# Patient Record
Sex: MALE | Race: BLACK OR AFRICAN AMERICAN | Employment: UNEMPLOYED | URBAN - METROPOLITAN AREA
[De-identification: names, ages, dates, MRNs, and addresses within clinical notes are randomized per-mention and may not be internally consistent; named-entity substitution may affect disease eponyms.]

---

## 2017-03-08 DIAGNOSIS — Z00.00 ROUTINE GENERAL MEDICAL EXAMINATION AT A HEALTH CARE FACILITY: ICD-10-CM

## 2017-03-08 DIAGNOSIS — Z86.73 HISTORY OF STROKE: ICD-10-CM

## 2017-03-08 DIAGNOSIS — Z13.39 SCREENING FOR ALCOHOLISM: ICD-10-CM

## 2017-03-08 DIAGNOSIS — M89.9 DISORDER OF BONE AND CARTILAGE: ICD-10-CM

## 2017-03-08 DIAGNOSIS — M94.9 DISORDER OF BONE AND CARTILAGE: ICD-10-CM

## 2017-03-08 DIAGNOSIS — Z23 ENCOUNTER FOR IMMUNIZATION: ICD-10-CM

## 2017-06-21 ENCOUNTER — OFFICE VISIT (OUTPATIENT)
Dept: FAMILY MEDICINE CLINIC | Age: 62
End: 2017-06-21

## 2017-06-21 VITALS
HEART RATE: 64 BPM | DIASTOLIC BLOOD PRESSURE: 80 MMHG | HEIGHT: 72 IN | BODY MASS INDEX: 29.39 KG/M2 | OXYGEN SATURATION: 97 % | WEIGHT: 217 LBS | TEMPERATURE: 97.1 F | RESPIRATION RATE: 17 BRPM | SYSTOLIC BLOOD PRESSURE: 148 MMHG

## 2017-06-21 DIAGNOSIS — S61.432A PUNCTURE WOUND OF LEFT HAND, FOREIGN BODY PRESENCE UNSPECIFIED, INITIAL ENCOUNTER: ICD-10-CM

## 2017-06-21 DIAGNOSIS — R29.898 LEFT ARM WEAKNESS: ICD-10-CM

## 2017-06-21 DIAGNOSIS — Z86.73 HISTORY OF STROKE: ICD-10-CM

## 2017-06-21 DIAGNOSIS — S69.92XA HAND INJURY, LEFT, INITIAL ENCOUNTER: Primary | ICD-10-CM

## 2017-06-21 RX ORDER — SULFAMETHOXAZOLE AND TRIMETHOPRIM 400; 80 MG/1; MG/1
1 TABLET ORAL 2 TIMES DAILY
Qty: 14 TAB | Refills: 0 | Status: SHIPPED | OUTPATIENT
Start: 2017-06-21 | End: 2017-06-28

## 2017-06-21 NOTE — MR AVS SNAPSHOT
Visit Information Date & Time Provider Department Dept. Phone Encounter #  
 6/21/2017 11:45 AM Derrick Oneal, [address] [id number] Follow-up Instructions Return for Patient will call for follow up after seeing hand specialist  . Upcoming Health Maintenance Date Due Hepatitis C Screening 1955 DTaP/Tdap/Td series (1 - Tdap) 12/21/1975 FOBT Q 1 YEAR AGE 50-75 12/21/2004 ZOSTER VACCINE AGE 60> 12/21/2014 INFLUENZA AGE 9 TO ADULT 8/1/2017 Allergies as of 6/21/2017  Review Complete On: 4/10/6860 By: Malathi De Leon. Reji Res, BLAYNEN No Known Allergies Current Immunizations  Never Reviewed No immunizations on file. Not reviewed this visit You Were Diagnosed With   
  
 Codes Comments Hand injury, left, initial encounter    -  Primary ICD-10-CM: L87.56ZZ ICD-9-CM: 959.4 Left arm weakness     ICD-10-CM: R29.898 ICD-9-CM: 729.89 Puncture wound of left hand, foreign body presence unspecified, initial encounter     ICD-10-CM: H51.147O ICD-9-CM: 882.0 History of stroke     ICD-10-CM: Z86.73 
ICD-9-CM: V12.54 Vitals BP Pulse Temp Resp Height(growth percentile) Weight(growth percentile) 148/80 (BP 1 Location: Right arm, BP Patient Position: Sitting) 64 97.1 °F (36.2 °C) (Oral) 17 6' (1.829 m) 217 lb (98.4 kg) SpO2 BMI Smoking Status 97% 29.43 kg/m2 Never Smoker BMI and BSA Data Body Mass Index Body Surface Area  
 29.43 kg/m 2 2.24 m 2 Preferred Pharmacy Pharmacy Name Phone [facility], [address] Your Updated Medication List  
  
   
This list is accurate as of: 6/21/17 12:45 PM.  Always use your most recent med list.  
  
  
  
  
 ALEVE 220 mg tablet Generic drug:  naproxen sodium Take 220 mg by mouth two (2) times daily (with meals). ergocalciferol 50,000 unit capsule Commonly known as:  ERGOCALCIFEROL Take 1 Cap by mouth every seven (7) days. MOTRIN  mg tablet Generic drug:  ibuprofen Take  by mouth. trimethoprim-sulfamethoxazole  mg per tablet Commonly known as:  Velvet Ping Take 1 Tab by mouth two (2) times a day for 7 days. Prescriptions Sent to Pharmacy Refills  
 trimethoprim-sulfamethoxazole (BACTRIM, SEPTRA)  mg per tablet 0 Sig: Take 1 Tab by mouth two (2) times a day for 7 days. Class: Normal  
 Pharmacy: [facility] #: [phone] Route: Oral  
  
We Performed the Following REFERRAL TO ORTHOPEDIC SURGERY [REF62 Custom] Comments:  
 Please evaluate patient for Left hand injury. Follow-up Instructions Return for Patient will call for follow up after seeing hand specialist  . Referral Information Referral ID Referred By Referred To  
  
 [id number] [facility], [address] [place] [address] Phone: [phone] Fax: [phone] Visits Status Start Date End Date 1 New Request 6/21/17 6/21/18 If your referral has a status of pending review or denied, additional information will be sent to support the outcome of this decision. Patient Instructions 1.) They will call to set up appointment with the hand specialist. 
 
2.) Take antibiotic for 7 days for your hand. 3.) Return after seeing the hand specialist.  
 
 
  
Puncture Wounds: Care Instructions Your Care Instructions A puncture wound can happen anywhere on your body. These wounds tend to be narrower and deeper than cuts. A puncture wound is usually left open instead of being closed. This is because a puncture wound can be easily infected, and closing it can make infection even more likely. You will probably have a bandage over the wound. The doctor has checked you carefully, but problems can develop later. If you notice any problems or new symptoms, get medical treatment right away. Follow-up care is a key part of your treatment and safety. Be sure to make and go to all appointments, and call your doctor if you are having problems. It's also a good idea to know your test results and keep a list of the medicines you take. How can you care for yourself at home? · Keep the wound dry for the first 24 to 48 hours. After this, you can shower if your doctor okays it. Pat the wound dry. · Don't soak the wound, such as in a bathtub. Your doctor will tell you when it's safe to get the wound wet. · If your doctor told you how to care for your wound, follow your doctor's instructions. If you did not get instructions, follow this general advice: ¨ After the first 24 to 48 hours, wash the wound with clean water 2 times a day. Don't use hydrogen peroxide or alcohol, which can slow healing. ¨ You may cover the wound with a thin layer of petroleum jelly, such as Vaseline, and a nonstick bandage. ¨ Apply more petroleum jelly and replace the bandage as needed. · Prop up the sore area on pillows anytime you sit or lie down during the next 3 days. Try to keep it above the level of your heart. This helps reduce swelling. · Avoid any activity that could cause your wound to get worse. · Be safe with medicines. Read and follow all instructions on the label. ¨ If the doctor gave you a prescription medicine for pain, take it as prescribed. ¨ If you are not taking a prescription pain medicine, ask your doctor if you can take an over-the-counter medicine. · If your doctor prescribed antibiotics, take them as directed. Do not stop taking them just because you feel better. You need to take the full course of antibiotics. When should you call for help? Call your doctor now or seek immediate medical care if: 
· You have new pain, or your pain gets worse. · The wound starts to bleed, and blood soaks through the bandage. Oozing small amounts of blood is normal. 
· The skin near the wound is cold or pale or changes color. · You have tingling, weakness, or numbness near the wound. · You have trouble moving the area near the wound. · You have symptoms of infection, such as: 
¨ Increased pain, swelling, warmth, or redness around the wound. ¨ Red streaks leading from the wound. ¨ Pus draining from the wound. ¨ A fever. Watch closely for changes in your health, and be sure to contact your doctor if: · The wound is not closing (getting smaller). · You do not get better as expected. Where can you learn more? Go to http://chantal-polo.info/. Enter W137 in the search box to learn more about \"Puncture Wounds: Care Instructions. \" Current as of: March 20, 2017 Content Version: 11.3 © 8525-6286 [employer]. Care instructions adapted under license by [employer] (which disclaims liability or warranty for this information). If you have questions about a medical condition or this instruction, always ask your healthcare professional. [address] any warranty or liability for your use of this information. Introducing [facility]! [place] introduces SeekPanda patient portal. Now you can access parts of your medical record, email your doctor's office, and request medication refills online. 1. In your internet browser, go to [email or web address] 2. Click on the First Time User? Click Here link in the Sign In box. You will see the New Member Sign Up page. 3. Enter your SeekPanda Access Code exactly as it appears below. You will not need to use this code after youve completed the sign-up process. If you do not sign up before the expiration date, you must request a new code. · SeekPanda Access Code: [email or web address] Expires: 9/19/2017 11:23 AM 
 
 4. Enter the last four digits of your Social Security Number (xxxx) and Date of Birth (mm/dd/yyyy) as indicated and click Submit. You will be taken to the next sign-up page. 5. Create a Italia Online ID. This will be your Italia Online login ID and cannot be changed, so think of one that is secure and easy to remember. 6. Create a Italia Online password. You can change your password at any time. 7. Enter your Password Reset Question and Answer. This can be used at a later time if you forget your password. 8. Enter your e-mail address. You will receive e-mail notification when new information is available in [address] 9. Click Sign Up. You can now view and download portions of your medical record. 10. Click the Download Summary menu link to download a portable copy of your medical information. If you have questions, please visit the Frequently Asked Questions section of the Italia Online website. Remember, Italia Online is NOT to be used for urgent needs. For medical emergencies, dial 911. Now available from your iPhone and Android! Please provide this summary of care documentation to your next provider. Your primary care clinician is listed as Simone Alejandre. If you have any questions after today's visit, please call [phone].

## 2017-06-21 NOTE — PROGRESS NOTES
Progress Note    Patient: Meagan Camara MRN: [id number]  SSN: [id number]    YOB: 1955  Age: 57 y.o. Sex: male          Subjective:   Meagan Camara is a 57 y.o. male who is here for an acute care visit for left hand injury. Meagan Camara states that the incident occurred a week ago. The nails in his stroke hand dug into it and eventually the wound got infected. He does not mention any fevers or chills associated with the wound. He denies any pain in the hand. Objective:     Vitals:    06/21/17 1154   BP: 148/80   Pulse: 64   Resp: 17   Temp: 97.1 °F (36.2 °C)   TempSrc: Oral   SpO2: 97%   Weight: 217 lb (98.4 kg)   Height: 6' (1.829 m)        Review of Systems:  Pertinent items are noted in the History of Present Illness. Physical Exam:   GENERAL: alert, cooperative, no distress, appears stated age  EYE: conjunctivae/corneas clear. PERRL, EOM's intact. Fundi benign  THROAT & NECK: normal and no erythema or exudates noted. LUNG: clear to auscultation bilaterally  HEART: systolic murmur: late systolic 3/6, crescendo throughout the precordium  ABDOMEN: soft, non-tender.  Bowel sounds normal. No masses,  no organomegaly, abnormal findings:  obese  EXTREMITIES:  Contracted left upper extremity  SKIN: Palmar puncture wounds with discoloration   NEUROLOGIC: positive findings: abnormal muscle tone in left upper extremity and abnormal gait       Lab/Data Review:  No new labs to review       Assessment:     1.) Left Hand Puncture Wounds: Patient placed on Bactrim for one week as well as an urgent referral to [facility] hand specialist.     Patient will return after seeing hand specialist.     Plan:     Orders Placed This Encounter    REFERRAL TO ORTHOPEDIC SURGERY    trimethoprim-sulfamethoxazole (BACTRIM, SEPTRA)  mg per tablet         Signed By: Simone Alejandre DO     June 21, 2017

## 2017-06-21 NOTE — PATIENT INSTRUCTIONS
1.) They will call to set up appointment with the hand specialist.    2.) Take antibiotic for 7 days for your hand. 3.) Return after seeing the hand specialist.          Puncture Wounds: Care Instructions  Your Care Instructions    A puncture wound can happen anywhere on your body. These wounds tend to be narrower and deeper than cuts. A puncture wound is usually left open instead of being closed. This is because a puncture wound can be easily infected, and closing it can make infection even more likely. You will probably have a bandage over the wound. The doctor has checked you carefully, but problems can develop later. If you notice any problems or new symptoms, get medical treatment right away. Follow-up care is a key part of your treatment and safety. Be sure to make and go to all appointments, and call your doctor if you are having problems. It's also a good idea to know your test results and keep a list of the medicines you take. How can you care for yourself at home? · Keep the wound dry for the first 24 to 48 hours. After this, you can shower if your doctor okays it. Pat the wound dry. · Don't soak the wound, such as in a bathtub. Your doctor will tell you when it's safe to get the wound wet. · If your doctor told you how to care for your wound, follow your doctor's instructions. If you did not get instructions, follow this general advice:  ¨ After the first 24 to 48 hours, wash the wound with clean water 2 times a day. Don't use hydrogen peroxide or alcohol, which can slow healing. ¨ You may cover the wound with a thin layer of petroleum jelly, such as Vaseline, and a nonstick bandage. ¨ Apply more petroleum jelly and replace the bandage as needed. · Prop up the sore area on pillows anytime you sit or lie down during the next 3 days. Try to keep it above the level of your heart. This helps reduce swelling. · Avoid any activity that could cause your wound to get worse.   · Be safe with medicines. Read and follow all instructions on the label. ¨ If the doctor gave you a prescription medicine for pain, take it as prescribed. ¨ If you are not taking a prescription pain medicine, ask your doctor if you can take an over-the-counter medicine. · If your doctor prescribed antibiotics, take them as directed. Do not stop taking them just because you feel better. You need to take the full course of antibiotics. When should you call for help? Call your doctor now or seek immediate medical care if:  · You have new pain, or your pain gets worse. · The wound starts to bleed, and blood soaks through the bandage. Oozing small amounts of blood is normal.  · The skin near the wound is cold or pale or changes color. · You have tingling, weakness, or numbness near the wound. · You have trouble moving the area near the wound. · You have symptoms of infection, such as:  ¨ Increased pain, swelling, warmth, or redness around the wound. ¨ Red streaks leading from the wound. ¨ Pus draining from the wound. ¨ A fever. Watch closely for changes in your health, and be sure to contact your doctor if:  · The wound is not closing (getting smaller). · You do not get better as expected. Where can you learn more? Go to http://chantal-polo.info/. Enter J955 in the search box to learn more about \"Puncture Wounds: Care Instructions. \"  Current as of: March 20, 2017  Content Version: 11.3  © 3672-0620 [employer]. Care instructions adapted under license by [employer] (which disclaims liability or warranty for this information). If you have questions about a medical condition or this instruction, always ask your healthcare professional. [place] [address] any warranty or liability for your use of this information.

## 2017-09-11 ENCOUNTER — HOSPITAL ENCOUNTER (OUTPATIENT)
Dept: LAB | Age: 62
Discharge: HOME | End: 2017-09-11
Payer: MEDICARE

## 2017-09-11 ENCOUNTER — OFFICE VISIT (OUTPATIENT)
Dept: FAMILY MEDICINE CLINIC | Age: 62
End: 2017-09-11

## 2017-09-11 VITALS
DIASTOLIC BLOOD PRESSURE: 82 MMHG | TEMPERATURE: 98.4 F | WEIGHT: 218 LBS | HEART RATE: 65 BPM | OXYGEN SATURATION: 96 % | SYSTOLIC BLOOD PRESSURE: 145 MMHG | HEIGHT: 72 IN | BODY MASS INDEX: 29.53 KG/M2 | RESPIRATION RATE: 18 BRPM

## 2017-09-11 DIAGNOSIS — Z86.73 HISTORY OF STROKE: ICD-10-CM

## 2017-09-11 DIAGNOSIS — R26.9 GAIT ABNORMALITY: Primary | ICD-10-CM

## 2017-09-11 DIAGNOSIS — R01.1 SYSTOLIC MURMUR: ICD-10-CM

## 2017-09-11 DIAGNOSIS — R29.898 LEFT ARM WEAKNESS: ICD-10-CM

## 2017-09-11 LAB
ALBUMIN SERPL-MCNC: 4.1 G/DL (ref 3.4–5)
ALBUMIN/GLOB SERPL: 1.2 {RATIO} (ref 0.8–1.7)
ALP SERPL-CCNC: 64 U/L (ref 45–117)
ALT SERPL-CCNC: 29 U/L (ref 16–61)
ANION GAP SERPL CALC-SCNC: 6 MMOL/L (ref 3–18)
AST SERPL-CCNC: 28 U/L (ref 15–37)
BASOPHILS # BLD: 0 K/UL (ref 0–0.06)
BASOPHILS NFR BLD: 1 % (ref 0–2)
BILIRUB SERPL-MCNC: 1.2 MG/DL (ref 0.2–1)
BUN SERPL-MCNC: 10 MG/DL (ref 7–18)
BUN/CREAT SERPL: 12 (ref 12–20)
CALCIUM SERPL-MCNC: 8.9 MG/DL (ref 8.5–10.1)
CHLORIDE SERPL-SCNC: 103 MMOL/L (ref 100–108)
CO2 SERPL-SCNC: 30 MMOL/L (ref 21–32)
CREAT SERPL-MCNC: 0.85 MG/DL (ref 0.6–1.3)
DIFFERENTIAL METHOD BLD: ABNORMAL
EOSINOPHIL # BLD: 0.3 K/UL (ref 0–0.4)
EOSINOPHIL NFR BLD: 5 % (ref 0–5)
ERYTHROCYTE [DISTWIDTH] IN BLOOD BY AUTOMATED COUNT: 13.2 % (ref 11.6–14.5)
EST. AVERAGE GLUCOSE BLD GHB EST-MCNC: NORMAL MG/DL
GLOBULIN SER CALC-MCNC: 3.3 G/DL (ref 2–4)
GLUCOSE SERPL-MCNC: 86 MG/DL (ref 74–99)
HBA1C MFR BLD: 4.4 % (ref 4.2–5.6)
HCT VFR BLD AUTO: 42.9 % (ref 36–48)
HGB BLD-MCNC: 13.5 G/DL (ref 13–16)
LYMPHOCYTES # BLD: 1.8 K/UL (ref 0.9–3.6)
LYMPHOCYTES NFR BLD: 34 % (ref 21–52)
MCH RBC QN AUTO: 30.4 PG (ref 24–34)
MCHC RBC AUTO-ENTMCNC: 31.5 G/DL (ref 31–37)
MCV RBC AUTO: 96.6 FL (ref 74–97)
MONOCYTES # BLD: 0.7 K/UL (ref 0.05–1.2)
MONOCYTES NFR BLD: 13 % (ref 3–10)
NEUTS SEG # BLD: 2.4 K/UL (ref 1.8–8)
NEUTS SEG NFR BLD: 47 % (ref 40–73)
PLATELET # BLD AUTO: 162 K/UL (ref 135–420)
PMV BLD AUTO: 13.8 FL (ref 9.2–11.8)
POTASSIUM SERPL-SCNC: 4.1 MMOL/L (ref 3.5–5.5)
PROT SERPL-MCNC: 7.4 G/DL (ref 6.4–8.2)
RBC # BLD AUTO: 4.44 M/UL (ref 4.7–5.5)
SODIUM SERPL-SCNC: 139 MMOL/L (ref 136–145)
WBC # BLD AUTO: 5.2 K/UL (ref 4.6–13.2)

## 2017-09-11 PROCEDURE — 83036 HEMOGLOBIN GLYCOSYLATED A1C: CPT | Performed by: INTERNAL MEDICINE

## 2017-09-11 PROCEDURE — 36415 COLL VENOUS BLD VENIPUNCTURE: CPT | Performed by: INTERNAL MEDICINE

## 2017-09-11 PROCEDURE — 82306 VITAMIN D 25 HYDROXY: CPT | Performed by: INTERNAL MEDICINE

## 2017-09-11 PROCEDURE — 85025 COMPLETE CBC W/AUTO DIFF WBC: CPT | Performed by: INTERNAL MEDICINE

## 2017-09-11 PROCEDURE — 80053 COMPREHEN METABOLIC PANEL: CPT | Performed by: INTERNAL MEDICINE

## 2017-09-11 NOTE — PATIENT INSTRUCTIONS
Please contact our office if you have any questions about your visit today. 1.) Please call [employer]  about drivers testing.     2.) Call [facility] about transportation at [phone].     3.) Please return in 1 month for follow up. Learning About Antiplatelet Medicines After a Stroke  Introduction  If you have had a stroke, you may have concerns about having another one. You want to do all you can do to avoid this. If your stroke was caused by a blood clot, one of the best things you can do is to take antiplatelet medicines. They can help prevent another stroke. In most cases, you don't take them if you had a stroke caused by a leak in an artery. These medicines are often called blood thinners. But they don't thin your blood. They work to keep platelets from sticking together and forming blood clots. (A platelet is a type of blood cell.) Blood clots can cause a stroke if they block a blood vessel in the brain. So by preventing blood clots, you are helping to prevent a stroke. Examples  · Aspirin (Sandi, Bufferin, Ecotrin)  · Aspirin with dipyridamole (Aggrenox)  · Clopidogrel (Plavix)  Possible side effects  These medicines make your blood take longer than normal to clot. This can cause bleeding, and you may bruise easily. In rare cases, they can cause you to bleed inside your body without an injury. If you have an injury, you might have bleeding that is hard to control. These medicines may have other side effects. Depending on which one you take, you may:  · Have diarrhea. · Feel sick to your stomach. · Have a headache. · Have some mild belly pain. You may have other side effects or reactions not listed here. Check the information that comes with your medicine. What to know about taking this medicine  · Be sure you get instructions about how to take your medicine safely. Blood thinners can cause serious bleeding problems. · Be safe with medicines.  Take your medicines exactly as prescribed. Call your doctor if you think you are having a problem with your medicine. · Check with your doctor or pharmacist before you use any other medicines, including over-the-counter medicines. Make sure your doctor knows all of the medicines, vitamins, herbal products, and supplements you take. Taking some medicines together can cause problems. Where can you learn more? Go to http://chantal-polo.info/. Enter P495 in the search box to learn more about \"Learning About Antiplatelet Medicines After a Stroke. \"  Current as of: November 17, 2016  Content Version: 11.3  © 3969-5310 [employer]. Care instructions adapted under license by [employer] (which disclaims liability or warranty for this information). If you have questions about a medical condition or this instruction, always ask your healthcare professional. [address] any warranty or liability for your use of this information.

## 2017-09-11 NOTE — MR AVS SNAPSHOT
Visit Information Date & Time Provider Department Dept. Phone Encounter #  
 9/11/2017 10:15 AM [id number] Maria Eugenia Wilson 76 [id number] Follow-up Instructions Return in about 1 month (around 10/11/2017) for Regular Follow Up. Gabino Almeida Upcoming Health Maintenance Date Due Hepatitis C Screening 1955 DTaP/Tdap/Td series (1 - Tdap) 12/21/1975 FOBT Q 1 YEAR AGE 50-75 12/21/2004 ZOSTER VACCINE AGE 60> 10/21/2014 INFLUENZA AGE 9 TO ADULT 8/1/2017 Allergies as of 9/11/2017  Review Complete On: 1/18/3460 By: Macho Kohler LPN No Known Allergies Current Immunizations  Never Reviewed No immunizations on file. Not reviewed this visit You Were Diagnosed With   
  
 Codes Comments Gait abnormality    -  Primary ICD-10-CM: R26.9 ICD-9-CM: 681. 2 Left arm weakness     ICD-10-CM: R29.898 ICD-9-CM: 729.89 Systolic murmur     RIP-63-ZE: R01.1 ICD-9-CM: 785.2 History of stroke     ICD-10-CM: Z86.73 
ICD-9-CM: V12.54 Vitals BP Pulse Temp Resp Height(growth percentile) Weight(growth percentile) 145/82 (BP 1 Location: Right arm, BP Patient Position: Sitting) 65 98.4 °F (36.9 °C) (Oral) 18 6' (1.829 m) 218 lb (98.9 kg) SpO2 BMI Smoking Status 96% 29.57 kg/m2 Never Smoker BMI and BSA Data Body Mass Index Body Surface Area  
 29.57 kg/m 2 2.24 m 2 [place]erred Pharmacy [place] [id number] [facility] Your Updated Medication List  
  
   
This list is accurate as of: 9/11/17 10:50 AM.  Always use your most recent med list.  
  
  
  
  
 ALEVE 220 mg tablet Generic drug:  naproxen sodium Take 220 mg by mouth two (2) times daily (with meals). ergocalciferol 50,000 unit capsule Commonly known as:  ERGOCALCIFEROL Take 1 Cap by mouth every seven (7) days. MOTRIN  mg tablet Generic drug:  ibuprofen Take  by mouth. We Performed the Following REFERRAL TO PHYSICAL THERAPY [UUP61 Custom] Comments:  
 Please evaluate patient for  Evaluation. Referral to [facility]. Follow-up Instructions Return in about 1 month (around 10/11/2017) for Regular Follow Up. Bryan Chaney Referral Information Referral ID Referred By Referred To  
  
 [id number] Artur Felix MOSES YOUNG Not Available Visits Status Start Date End Date 1 New Request 9/11/17 9/11/18 If your referral has a status of pending review or denied, additional information will be sent to support the outcome of this decision. Patient Instructions Please contact our office if you have any questions about your visit today. 1.) Please call [facility] about drivers testing.  
 
2.) Call [facility] about transportation at [phone].  
 
3.) Please return in 1 month for follow up. Learning About Antiplatelet Medicines After a Stroke Introduction If you have had a stroke, you may have concerns about having another one. You want to do all you can do to avoid this. If your stroke was caused by a blood clot, one of the best things you can do is to take antiplatelet medicines. They can help prevent another stroke. In most cases, you don't take them if you had a stroke caused by a leak in an artery. These medicines are often called blood thinners. But they don't thin your blood. They work to keep platelets from sticking together and forming blood clots. (A platelet is a type of blood cell.) Blood clots can cause a stroke if they block a blood vessel in the brain. So by preventing blood clots, you are helping to prevent a stroke. Examples · Aspirin (Sandi, Bufferin, Ecotrin) · Aspirin with dipyridamole (Aggrenox) · Clopidogrel (Plavix) Possible side effects These medicines make your blood take longer than normal to clot. This can cause bleeding, and you may bruise easily. In rare cases, they can cause you to bleed inside your body without an injury. If you have an injury, you might have bleeding that is hard to control. These medicines may have other side effects. Depending on which one you take, you may: 
· Have diarrhea. · Feel sick to your stomach. · Have a headache. · Have some mild belly pain. You may have other side effects or reactions not listed here. Check the information that comes with your medicine. What to know about taking this medicine · Be sure you get instructions about how to take your medicine safely. Blood thinners can cause serious bleeding problems. · Be safe with medicines. Take your medicines exactly as prescribed. Call your doctor if you think you are having a problem with your medicine. · Check with your doctor or pharmacist before you use any other medicines, including over-the-counter medicines. Make sure your doctor knows all of the medicines, vitamins, herbal products, and supplements you take. Taking some medicines together can cause problems. Where can you learn more? Go to http://chantal-polo.info/. Enter N780 in the search box to learn more about \"Learning About Antiplatelet Medicines After a Stroke. \" Current as of: November 17, 2016 Content Version: 11.3 © 4831-1282 [employer]. Care instructions adapted under license by [employer] (which disclaims liability or warranty for this information). If you have questions about a medical condition or this instruction, always ask your healthcare professional. [place] [address] any warranty or liability for your use of this information. Introducing [facility] & HEALTH SERVICES! Frances Shah introduces [employer] patient portal. Now you can access parts of your medical record, email your doctor's office, and request medication refills online. 1. In your internet browser, go to [email or web address] 2. Click on the First Time User? Click Here link in the Sign In box. You will see the New Member Sign Up page. 3. Enter your [employer] Access Code exactly as it appears below. You will not need to use this code after youve completed the sign-up process. If you do not sign up before the expiration date, you must request a new code. · [employer] Access Code: [email or web address] Expires: 9/19/2017 11:23 AM 
 
4. Enter the last four digits of your Social Security Number (xxxx) and Date of Birth (mm/dd/yyyy) as indicated and click Submit. You will be taken to the next sign-up page. 5. Create a Falcon Social ID. This will be your [employer] Social login ID and cannot be changed, so think of one that is secure and easy to remember. 6. Create a [employer] Social password. You can change your password at any time. 7. Enter your Password Reset Question and Answer. This can be used at a later time if you forget your password. 8. Enter your e-mail address. You will receive e-mail notification when new information is available in [address] 9. Click Sign Up. You can now view and download portions of your medical record. 10. Click the Download Summary menu link to download a portable copy of your medical information. If you have questions, please visit the Frequently Asked Questions section of the [employer] website. Remember, [employer] is NOT to be used for urgent needs. For medical emergencies, dial 911. Now available from your iPhone and Android! Please provide this summary of care documentation to your next provider. Your primary care clinician is listed as [id number] [place] [address]. If you have any questions after today's visit, please call [phone].
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
## 2017-09-11 NOTE — PROGRESS NOTES
Chief Complaint   Patient presents with    Other     here for exam to determine if he can drive again     1. Have you been to the ER, urgent care clinic since your last visit? Hospitalized since your last visit? No    2. Have you seen or consulted any other health care providers outside of the [address] [place] [address] since your last visit? Include any pap smears or colon screening.  No

## 2017-09-12 LAB — 25(OH)D3 SERPL-MCNC: 18.3 NG/ML (ref 30–100)

## 2017-09-14 ENCOUNTER — TELEPHONE (OUTPATIENT)
Dept: FAMILY MEDICINE CLINIC | Age: 62
End: 2017-09-14

## 2017-09-14 DIAGNOSIS — E55.9 VITAMIN D DEFICIENCY: ICD-10-CM

## 2017-09-14 RX ORDER — ERGOCALCIFEROL 1.25 MG/1
50000 CAPSULE ORAL
Qty: 4 CAP | Refills: 6 | Status: SHIPPED | OUTPATIENT
Start: 2017-09-14 | End: 2018-07-16 | Stop reason: SDUPTHER

## 2017-09-14 NOTE — TELEPHONE ENCOUNTER
Nash Anne,   Please let the patient know that his labs look good for the most part but I am placing him on Vitamin D because his levels are still low. He will take this once a week. Thanks.     CLAU

## 2017-10-30 ENCOUNTER — OFFICE VISIT (OUTPATIENT)
Dept: FAMILY MEDICINE CLINIC | Age: 62
End: 2017-10-30

## 2018-03-23 ENCOUNTER — OFFICE VISIT (OUTPATIENT)
Dept: FAMILY MEDICINE CLINIC | Age: 63
End: 2018-03-23

## 2018-03-23 VITALS
SYSTOLIC BLOOD PRESSURE: 162 MMHG | HEART RATE: 60 BPM | RESPIRATION RATE: 14 BRPM | BODY MASS INDEX: 30.34 KG/M2 | TEMPERATURE: 98 F | OXYGEN SATURATION: 98 % | HEIGHT: 72 IN | DIASTOLIC BLOOD PRESSURE: 90 MMHG | WEIGHT: 224 LBS

## 2018-03-23 DIAGNOSIS — S69.92XS: Primary | ICD-10-CM

## 2018-03-23 DIAGNOSIS — Z86.73 HISTORY OF STROKE: ICD-10-CM

## 2018-03-23 DIAGNOSIS — M79.642 LEFT HAND PAIN: ICD-10-CM

## 2018-03-23 DIAGNOSIS — L98.8 MACERATION OF SKIN: ICD-10-CM

## 2018-03-23 RX ORDER — SULFAMETHOXAZOLE AND TRIMETHOPRIM 800; 160 MG/1; MG/1
1 TABLET ORAL 2 TIMES DAILY
Qty: 14 TAB | Refills: 0 | Status: SHIPPED | OUTPATIENT
Start: 2018-03-23 | End: 2018-03-30

## 2018-03-23 RX ORDER — IBUPROFEN 800 MG/1
800 TABLET ORAL
Qty: 60 TAB | Refills: 1 | Status: SHIPPED | OUTPATIENT
Start: 2018-03-23 | End: 2018-11-12

## 2018-03-23 NOTE — MR AVS SNAPSHOT
[address] [facility], [place], [facility] 
 
 
 [place] [address] [address] [address] [address] [phone] Patient: Gallito Juan MRN: [id number] :1955 Visit Information Date & Time Provider Department Dept. Phone Encounter #  
 3/23/2018  1:00 PM Maria Eugenia Butts Robert Kim 77 [id number] Follow-up Instructions Return in about 10 days (around 2018) for Follow up on [place] paperwork. Upcoming Health Maintenance Date Due Hepatitis C Screening 1955 DTaP/Tdap/Td series (1 - Tdap) 1975 FOBT Q 1 YEAR AGE 50-75 2004 ZOSTER VACCINE AGE 60> 10/21/2014 Influenza Age 5 to Adult 2017 MEDICARE YEARLY EXAM 3/14/2018 Allergies as of 3/23/2018  Review Complete On: 3/23/2018 By: Laureen Rosen DO No Known Allergies Current Immunizations  Never Reviewed No immunizations on file. Not reviewed this visit You Were Diagnosed With   
  
 Codes Comments Hand injuries, left, sequela    -  Primary ICD-10-CM: S51.45BJ 
ICD-9-CM: 963. 9 Left hand pain     ICD-10-CM: S31.997 ICD-9-CM: 729.5 Maceration of skin     ICD-10-CM: L98.8 ICD-9-CM: 709.8 History of stroke     ICD-10-CM: Z86.73 
ICD-9-CM: V12.54 Vitals BP Pulse Temp Resp Height(growth percentile) Weight(growth percentile) 162/90 (BP 1 Location: Right arm, BP Patient Position: Sitting) 60 98 °F (36.7 °C) (Oral) 14 6' (1.829 m) 224 lb (101.6 kg) SpO2 BMI Smoking Status 98% 30.38 kg/m2 Never Smoker BMI and BSA Data Body Mass Index Body Surface Area  
 30.38 kg/m 2 2.27 m 2 Preferred Pharmacy Pharmacy Name Phone [place] [phone] [facility] Your Updated Medication List  
  
   
This list is accurate as of 3/23/18  1:36 PM.  Always use your most recent med list.  
  
  
  
  
 ALEVE 220 mg tablet Generic drug:  naproxen sodium Take 220 mg by mouth two (2) times daily (with meals). ergocalciferol 50,000 unit capsule Commonly known as:  ERGOCALCIFEROL Take 1 Cap by mouth every seven (7) days. * MOTRIN  mg tablet Generic drug:  ibuprofen Take  by mouth. * ibuprofen 800 mg tablet Commonly known as:  MOTRIN Take 1 Tab by mouth every eight (8) hours as needed for Pain. trimethoprim-sulfamethoxazole 160-800 mg per tablet Commonly known as:  BACTRIM DS, SEPTRA DS Take 1 Tab by mouth two (2) times a day for 7 days. * Notice: This list has 2 medication(s) that are the same as other medications prescribed for you. Read the directions carefully, and ask your doctor or other care provider to review them with you. Prescriptions Sent to Pharmacy Refills  
 trimethoprim-sulfamethoxazole (BACTRIM DS, SEPTRA DS) 160-800 mg per tablet 0 Sig: Take 1 Tab by mouth two (2) times a day for 7 days. Class: Normal  
 Pharmacy: [address], [place] Ph #: [phone] Route: Oral  
 ibuprofen (MOTRIN) 800 mg tablet 1 Sig: Take 1 Tab by mouth every eight (8) hours as needed for Pain. Class: Normal  
 Pharmacy: [address], [place] Ph #: [phone] Route: Oral  
  
We Performed the Following REFERRAL TO ORTHOPEDIC SURGERY [REF62 Custom] Comments:  
 Please evaluate patient for evaluation for contracted fingers of left hand. Patient may need brace fabrication. REFERRAL TO PHYSICAL THERAPY [UGA61 Custom] Comments:  
 Please evaluate patient for post stroke rehabilitation. Patient needs left hand PT Follow-up Instructions Return in about 10 days (around 4/2/2018) for Follow up on [place] paperwork. Referral Information Referral ID Referred By Referred To  
  
 [id number] MOSES Coates MD   
   [address] [facility] [address] [place], [address] Phone: [phone] Fax: [phone] Visits Status Start Date End Date 1 New Request 3/23/18 3/23/19 If your referral has a status of pending review or denied, additional information will be sent to support the outcome of this decision. Referral ID Referred By Referred To  
 [id number] KAILEY MarksBonMARIBELL HECTOR Not Available Visits Status Start Date End Date 1 New Request 3/23/18 3/23/19 If your referral has a status of pending review or denied, additional information will be sent to support the outcome of this decision. Patient Instructions 1.) They will call regarding referral to the orthopedic specialist. 
 
2.) Please complete antibiotics that were ordered. Use ibuprofen as needed for pain. 3.) Return on April 2nd for [facility] paperwork. Introducing [facility] & [facility] SERVICES! Shirley Duke introduces [employer] patient portal. Now you can access parts of your medical record, email your doctor's office, and request medication refills online. 1. In your internet browser, go to [email or web address] 2. Click on the First Time User? Click Here link in the Sign In box. You will see the New Member Sign Up page. 3. Enter your [employer] Access Code exactly as it appears below. You will not need to use this code after youve completed the sign-up process. If you do not sign up before the expiration date, you must request a new code. · [employer] Access Code: [email or web address] Expires: 6/21/2018  1:36 PM 
 
4. Enter the last four digits of your Social Security Number (xxxx) and Date of Birth (mm/dd/yyyy) as indicated and click Submit. You will be taken to the next sign-up page. 5. Create a ShangPint ID. This will be your [employer] login ID and cannot be changed, so think of one that is secure and easy to remember. 6. Create a ShangPint password. You can change your password at any time. 7. Enter your Password Reset Question and Answer. This can be used at a later time if you forget your password. 8. Enter your e-mail address. You will receive e-mail notification when new information is available in [address] 9. Click Sign Up. You can now view and download portions of your medical record. 10. Click the Download Summary menu link to download a portable copy of your medical information. If you have questions, please visit the Frequently Asked Questions section of the [employer] website. Remember, [employer] is NOT to be used for urgent needs. For medical emergencies, dial 911. Now available from your iPhone and Android! Please provide this summary of care documentation to your next provider. Your primary care clinician is listed as Ginette Haney. If you have any questions after today's visit, please call [phone].
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
## 2018-03-23 NOTE — PATIENT INSTRUCTIONS
1.) They will call regarding referral to the orthopedic specialist.    2.) Please complete antibiotics that were ordered. Use ibuprofen as needed for pain. 3.) Return on April 2nd for [facility] paperwork.

## 2018-03-23 NOTE — PROGRESS NOTES
Yevtte García is a 60 y.o. male (: 1955) presenting to address:    Chief Complaint   Patient presents with    Follow-up     Patient left Hand       Is someone accompanying this pt? Yes. Patients Daughter    Is the patient using any DME equipment during OV? Yes. He has a can but patient did not bring it to today's appointment      Learning Assessment:     Learning Assessment 3/17/2015   PRIMARY LEARNER Patient   HIGHEST LEVEL OF EDUCATION - PRIMARY LEARNER  GRADUATED HIGH SCHOOL OR GED   BARRIERS PRIMARY LEARNER PHYSICAL   CO-LEARNER CAREGIVER No   PRIMARY LANGUAGE ENGLISH   LEARNER PREFERENCE PRIMARY READING   ANSWERED BY patient   RELATIONSHIP SELF     Depression Screening:     PHQ over the last two weeks 3/23/2018   Little interest or pleasure in doing things Not at all   Feeling down, depressed or hopeless Not at all   Total Score PHQ 2 0     Fall Risk Assessment:   No flowsheet data found. Abuse Screening:     Abuse Screening Questionnaire 2016   Do you ever feel afraid of your partner? N   Are you in a relationship with someone who physically or mentally threatens you? N   Is it safe for you to go home? Y     Coordination of Care Questionaire:   1. Have you been to the ER, urgent care clinic since your last visit? Hospitalized since your last visit? NO    2. Have you seen or consulted any other health care providers outside of the [address] [place] [address] since your last visit? Include any pap smears or colon screening.  NO

## 2018-03-29 ENCOUNTER — OFFICE VISIT (OUTPATIENT)
Dept: ORTHOPEDIC SURGERY | Age: 63
End: 2018-03-29

## 2018-03-29 VITALS
HEART RATE: 63 BPM | BODY MASS INDEX: 30.34 KG/M2 | HEIGHT: 72 IN | DIASTOLIC BLOOD PRESSURE: 86 MMHG | SYSTOLIC BLOOD PRESSURE: 147 MMHG | WEIGHT: 224 LBS

## 2018-03-29 DIAGNOSIS — M79.642 LEFT HAND PAIN: Primary | ICD-10-CM
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
## 2018-04-02 ENCOUNTER — OFFICE VISIT (OUTPATIENT)
Dept: FAMILY MEDICINE CLINIC | Age: 63
End: 2018-04-02

## 2018-04-02 VITALS
RESPIRATION RATE: 16 BRPM | OXYGEN SATURATION: 99 % | HEART RATE: 60 BPM | TEMPERATURE: 97.1 F | WEIGHT: 224 LBS | BODY MASS INDEX: 31.36 KG/M2 | SYSTOLIC BLOOD PRESSURE: 138 MMHG | HEIGHT: 71 IN | DIASTOLIC BLOOD PRESSURE: 80 MMHG

## 2018-04-02 DIAGNOSIS — R29.898 LEFT ARM WEAKNESS: Primary | ICD-10-CM

## 2018-04-02 NOTE — MR AVS SNAPSHOT
[address] [place] [address] 
 
 
 [address] [id number] [address] [address] [phone] Patient: Zoltan Hennessy MRN: [id number] :1955 Visit Information Date & Time Provider Department Dept. Phone Encounter #  
 2018 11:15 AM Hannah Blake, [address] [id number] Follow-up Instructions Return in about 2 weeks (around 2018) for Follow up. Upcoming Health Maintenance Date Due Hepatitis C Screening 1955 DTaP/Tdap/Td series (1 - Tdap) 1975 FOBT Q 1 YEAR AGE 50-75 2004 ZOSTER VACCINE AGE 60> 10/21/2014 Influenza Age 5 to Adult 2017 MEDICARE YEARLY EXAM 3/14/2018 Allergies as of 2018  Review Complete On: 2018 By: Hannah Blake DO No Known Allergies Current Immunizations  Never Reviewed No immunizations on file. Not reviewed this visit You Were Diagnosed With   
  
 Codes Comments Left arm weakness    -  Primary ICD-10-CM: R29.898 ICD-9-CM: 729.89 Vitals BP Pulse Temp Resp Height(growth percentile) Weight(growth percentile) 138/80 (BP 1 Location: Right arm, BP Patient Position: Sitting) 60 97.1 °F (36.2 °C) (Oral) 16 5' 11\" (1.803 m) 224 lb (101.6 kg) SpO2 BMI Smoking Status 99% 31.24 kg/m2 Never Smoker BMI and BSA Data Body Mass Index Body Surface Area  
 31.24 kg/m 2 2.26 m 2 Preferred Pharmacy Pharmacy Name Phone [facility] Your Updated Medication List  
  
   
This list is accurate as of 18 11:58 AM.  Always use your most recent med list.  
  
  
  
  
 ALEVE 220 mg tablet Generic drug:  naproxen sodium Take 220 mg by mouth two (2) times daily (with meals). ergocalciferol 50,000 unit capsule Commonly known as:  ERGOCALCIFEROL Take 1 Cap by mouth every seven (7) days. * MOTRIN  mg tablet Generic drug:  ibuprofen Take  by mouth. * ibuprofen 800 mg tablet Commonly known as:  MOTRIN Take 1 Tab by mouth every eight (8) hours as needed for Pain. * Notice: This list has 2 medication(s) that are the same as other medications prescribed for you. Read the directions carefully, and ask your doctor or other care provider to review them with you. Follow-up Instructions Return in about 2 weeks (around 4/17/2018) for Follow up. Patient Instructions Please contact our office if you have any questions about your visit today. Introducing [address]! [employer] [facility] introduces [employer] patient portal. Now you can access parts of your medical record, email your doctor's office, and request medication refills online. 1. In your internet browser, go to [email or web address] 2. Click on the First Time User? Click Here link in the Sign In box. You will see the New Member Sign Up page. 3. Enter your [employer] Access Code exactly as it appears below. You will not need to use this code after youve completed the sign-up process. If you do not sign up before the expiration date, you must request a new code. · [employer] Access Code: [email or web address] Expires: 6/21/2018  1:36 PM 
 
4. Enter the last four digits of your Social Security Number (xxxx) and Date of Birth (mm/dd/yyyy) as indicated and click Submit. You will be taken to the next sign-up page. 5. Create a [employer] ID. This will be your [employer] login ID and cannot be changed, so think of one that is secure and easy to remember. 6. Create a [employer] password. You can change your password at any time. 7. Enter your Password Reset Question and Answer. This can be used at a later time if you forget your password. 8. Enter your e-mail address. You will receive e-mail notification when new information is available in [address] 9. Click Sign Up. You can now view and download portions of your medical record. 10. Click the Download Summary menu link to download a portable copy of your medical information. If you have questions, please visit the Frequently Asked Questions section of the [employer] website. Remember, [employer] is NOT to be used for urgent needs. For medical emergencies, dial 911. Now available from your iPhone and Android! Please provide this summary of care documentation to your next provider. Your primary care clinician is listed as Natalie Vences. If you have any questions after today's visit, please call [phone].
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
## 2018-04-02 NOTE — PROGRESS NOTES
Chief Complaint   Patient presents with    Other     [place] paperwork needs to be completed     1. Have you been to the ER, urgent care clinic since your last visit? Hospitalized since your last visit? No    2. Have you seen or consulted any other health care providers outside of the [address] [place] [address] since your last visit? Include any pap smears or colon screening.  No

## 2018-04-17 ENCOUNTER — OFFICE VISIT (OUTPATIENT)
Dept: FAMILY MEDICINE CLINIC | Age: 63
End: 2018-04-17

## 2018-04-17 VITALS
WEIGHT: 227 LBS | OXYGEN SATURATION: 99 % | TEMPERATURE: 97.3 F | SYSTOLIC BLOOD PRESSURE: 159 MMHG | HEART RATE: 60 BPM | BODY MASS INDEX: 31.78 KG/M2 | HEIGHT: 71 IN | RESPIRATION RATE: 17 BRPM | DIASTOLIC BLOOD PRESSURE: 90 MMHG

## 2018-04-17 DIAGNOSIS — R03.0 ELEVATED BLOOD PRESSURE, SITUATIONAL: ICD-10-CM

## 2018-04-17 DIAGNOSIS — S60.512D INFECTED ABRASION OF LEFT HAND, SUBSEQUENT ENCOUNTER: Primary | ICD-10-CM

## 2018-04-17 DIAGNOSIS — L08.9 INFECTED ABRASION OF LEFT HAND, SUBSEQUENT ENCOUNTER: Primary | ICD-10-CM
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
## 2018-04-17 NOTE — MR AVS SNAPSHOT
Shanita Munoz 
 
 
 [address] [place] [address] 
[phone] Patient: Gaurav Weinstein MRN: [id number] :1955 Visit Information Date & Time Provider Department Dept. Phone Encounter #  
 2018  9:00 AM [id number] [address] [id number] Follow-up Instructions Return in about 1 month (around 2018) for Follow Up . Upcoming Health Maintenance Date Due Hepatitis C Screening 1955 DTaP/Tdap/Td series (1 - Tdap) 1975 FOBT Q 1 YEAR AGE 50-75 2004 ZOSTER VACCINE AGE 60> 10/21/2014 Influenza Age 5 to Adult 2017 MEDICARE YEARLY EXAM 3/14/2018 Allergies as of 2018  Review Complete On: 2018 By: [id number] Guardado,  No Known Allergies Current Immunizations  Never Reviewed No immunizations on file. Not reviewed this visit You Were Diagnosed With   
  
 Codes Comments Infected abrasion of left hand, subsequent encounter    -  Primary ICD-10-CM: S60.512D, L08.9 ICD-9-CM: V58.89, 914.1 Elevated blood pressure, situational     ICD-10-CM: R03.0 ICD-9-CM: 796.2 Vitals BP Pulse Temp Resp Height(growth percentile) Weight(growth percentile) 159/90 (BP 1 Location: Right arm, BP Patient Position: Sitting) 60 97.3 °F (36.3 °C) (Oral) 17 5' 11\" (1.803 m) 227 lb (103 kg) SpO2 BMI Smoking Status 99% 31.66 kg/m2 Never Smoker BMI and BSA Data Body Mass Index Body Surface Area  
 31.66 kg/m 2 2.27 m 2 Preferred Pharmacy Pharmacy Name Phone [facility], [address] Your Updated Medication List  
  
   
This list is accurate as of 18  9:50 AM.  Always use your most recent med list.  
  
  
  
  
 ALEVE 220 mg tablet Generic drug:  naproxen sodium Take 220 mg by mouth two (2) times daily (with meals). ergocalciferol 50,000 unit capsule Commonly known as:  ERGOCALCIFEROL Take 1 Cap by mouth every seven (7) days. * MOTRIN  mg tablet Generic drug:  ibuprofen Take  by mouth. * ibuprofen 800 mg tablet Commonly known as:  MOTRIN Take 1 Tab by mouth every eight (8) hours as needed for Pain. * Notice: This list has 2 medication(s) that are the same as other medications prescribed for you. Read the directions carefully, and ask your doctor or other care provider to review them with you. Follow-up Instructions Return in about 1 month (around 5/17/2018) for Follow Up . To-Do List   
 04/17/2018 11:30 AM  
  Appointment with Lisa Carbajal OT at [facility] ([phone]) Patient Instructions Please contact our office if you have any questions about your visit today. 1.) Please contact occupational therapy about setting up an appointment with them. 2.) Please monitor blood pressures regularly. Goal blood pressure is less than 130/80. Check this daily. 3.) Return in 1 month for follow up. Learning About Stroke Rehabilitation What is stroke rehabilitation? Stroke rehabilitation (rehab) is training and therapy to help you relearn to do everyday things you have not been able to do since your stroke. The focus will depend on how the stroke has affected your ability to do the things you want and need to do. Rehab begins in the hospital. It starts as soon as you are able. You will have a team of doctors, nurses, and therapists to help you relearn daily activities. This can include eating, bathing, and dressing. You also may need help to learn how to walk or talk again. If the stroke damaged your memory, you will learn ways to improve it. You will get better faster if you begin rehab soon after the stroke. But even with rehab, you may not be able to do all the things you could before the stroke. You may recover the most in the first few weeks or months after your stroke. But you can keep getting better for years. It just may happen more slowly. And it may take a long time and a lot of hard work. Don't give up hope. After the hospital, you may go to a rehab facility or a nursing home for a while. Or you may go home. Wherever you go, keep working on your rehab and do a little every day. It's going to be important for you to get the support you need. Let your loved ones help you. Involve them in your treatment. Talk to others who have had a stroke, and find out how they handled ups and downs. How can stroke rehab specialists help? Your stroke rehab team will include doctors and nurses who specialize in stroke rehab, as well as other professionals. Each team member will help you in specific ways. The team may include the following professionals. Rehab doctor A rehab doctor is a specialist in charge of your rehab program. He or she may also work on special problems, such as muscle cramps and spasms. Rehab nurses Rehab nurses can help you relearn basic activities of daily life. For example, they can help you learn how to: · Take care of your health, including a schedule for medicine. · Get from your bed to a wheelchair. · Bathe. · Control your bowels or bladder. Physical therapist 
A stroke often takes away a person's ability to move in certain ways. A physical therapist helps you get back as much movement, balance, and coordination as possible. Physical therapy usually includes exercises. The exercises can help you get back your ability to walk and move as much as possible. It's important to practice these exercises over and over again. Your therapist may also help you learn to use a wheelchair or walker. And he or she may teach you how to use stairs safely.  
Occupational therapist 
 An occupational therapist helps you practice daily tasks like eating, bathing, dressing, and writing. For example, he or she may help you learn how to: · Prepare meals and clean your house. · Drive your car. · Use tools and devices that can help if you no longer have full use of both hands. For example, velcro can replace buttons on clothing. · Get grab bars for your bathroom. · Make your home safe if you have strength, balance, or vision problems. Speech therapist 
A speech therapist can help you relearn how to talk or find new ways to express yourself. Swallowing is sometimes a problem after a stroke. This therapist can help you improve your ability to swallow. A speech therapist can also help you work on reading and writing skills. Psychologist or counselor Emotions like fear, anxiety, anger, sadness, frustration, and grief are common after a stroke. A psychologist or counselor can help you deal with your emotions. They can also help you get treatment if you have depression. Vocational counselor Stroke can leave you with disabilities that make it hard to do your job. A vocational counselor can help you return to your job or find a new one. He or she can help you: 
· Identify your current skills and prepare a new resume. · Search for a job. · Understand the laws that protect disabled workers. Recreational therapist 
A recreational therapist helps you return to doing things you enjoy. This may include the arts, hobbies, sports, or leisure activities. Follow-up care is a key part of your treatment and safety. Be sure to make and go to all appointments, and call your doctor if you are having problems. It's also a good idea to know your test results and keep a list of the medicines you take. Where can you learn more? Go to http://chantal-polo.info/. Enter N890 in the search box to learn more about \"Learning About Stroke Rehabilitation. \" Current as of: March 20, 2017 Content Version: 11.4 © 8534-3892 Healthwise, [employer]. Care instructions adapted under license by [employer] (which disclaims liability or warranty for this information). If you have questions about a medical condition or this instruction, always ask your healthcare professional. [address] any warranty or liability for your use of this information. Introducing [facility]! Danielle Mojica introduces VoltServer patient portal. Now you can access parts of your medical record, email your doctor's office, and request medication refills online. 1. In your internet browser, go to [email or web address]. [employer] Business [email or web address] 2. Click on the First Time User? Click Here link in the Sign In box. You will see the New Member Sign Up page. 3. Enter your VoltServer Access Code exactly as it appears below. You will not need to use this code after youve completed the sign-up process. If you do not sign up before the expiration date, you must request a new code. · [employer] Access Code: [email or web address] Expires: 6/21/2018  1:36 PM 
 
4. Enter the last four digits of your Social Security Number (xxxx) and Date of Birth (mm/dd/yyyy) as indicated and click Submit. You will be taken to the next sign-up page. 5. Create a VoltServer ID. This will be your VoltServer login ID and cannot be changed, so think of one that is secure and easy to remember. 6. Create a VoltServer password. You can change your password at any time. 7. Enter your Password Reset Question and Answer. This can be used at a later time if you forget your password. 8. Enter your e-mail address. You will receive e-mail notification when new information is available in [address] 9. Click Sign Up. You can now view and download portions of your medical record. 10. Click the Download Summary menu link to download a portable copy of your medical information. If you have questions, please visit the Frequently Asked Questions section of the "[employer]"t website. Remember, [employer] is NOT to be used for urgent needs. For medical emergencies, dial 911. Now available from your iPhone and Android! Please provide this summary of care documentation to your next provider. Your primary care clinician is listed as Kannan Heredia. If you have any questions after today's visit, please call [phone].

## 2018-04-17 NOTE — PATIENT INSTRUCTIONS
Please contact our office if you have any questions about your visit today. 1.) Please contact occupational therapy about setting up an appointment with them. 2.) Please monitor blood pressures regularly. Goal blood pressure is less than 130/80. Check this daily. 3.) Return in 1 month for follow up. Learning About Stroke Rehabilitation  What is stroke rehabilitation? Stroke rehabilitation (rehab) is training and therapy to help you relearn to do everyday things you have not been able to do since your stroke. The focus will depend on how the stroke has affected your ability to do the things you want and need to do. Rehab begins in the hospital. It starts as soon as you are able. You will have a team of doctors, nurses, and therapists to help you relearn daily activities. This can include eating, bathing, and dressing. You also may need help to learn how to walk or talk again. If the stroke damaged your memory, you will learn ways to improve it. You will get better faster if you begin rehab soon after the stroke. But even with rehab, you may not be able to do all the things you could before the stroke. You may recover the most in the first few weeks or months after your stroke. But you can keep getting better for years. It just may happen more slowly. And it may take a long time and a lot of hard work. Don't give up hope. After the hospital, you may go to a rehab facility or a nursing home for a while. Or you may go home. Wherever you go, keep working on your rehab and do a little every day. It's going to be important for you to get the support you need. Let your loved ones help you. Involve them in your treatment. Talk to others who have had a stroke, and find out how they handled ups and downs. How can stroke rehab specialists help? Your stroke rehab team will include doctors and nurses who specialize in stroke rehab, as well as other professionals.  Each team member will help you in specific ways. The team may include the following professionals. Rehab doctor  A rehab doctor is a specialist in charge of your rehab program. He or she may also work on special problems, such as muscle cramps and spasms. Rehab nurses  Rehab nurses can help you relearn basic activities of daily life. For example, they can help you learn how to:  · Take care of your health, including a schedule for medicine. · Get from your bed to a wheelchair. · Bathe. · Control your bowels or bladder. Physical therapist  A stroke often takes away a person's ability to move in certain ways. A physical therapist helps you get back as much movement, balance, and coordination as possible. Physical therapy usually includes exercises. The exercises can help you get back your ability to walk and move as much as possible. It's important to practice these exercises over and over again. Your therapist may also help you learn to use a wheelchair or walker. And he or she may teach you how to use stairs safely. Occupational therapist  An occupational therapist helps you practice daily tasks like eating, bathing, dressing, and writing. For example, he or she may help you learn how to:  · Prepare meals and clean your house. · Drive your car. · Use tools and devices that can help if you no longer have full use of both hands. For example, velcro can replace buttons on clothing. · Get grab bars for your bathroom. · Make your home safe if you have strength, balance, or vision problems. Speech therapist  A speech therapist can help you relearn how to talk or find new ways to express yourself. Swallowing is sometimes a problem after a stroke. This therapist can help you improve your ability to swallow. A speech therapist can also help you work on reading and writing skills. Psychologist or counselor  Emotions like fear, anxiety, anger, sadness, frustration, and grief are common after a stroke.  A psychologist or counselor can help you deal with your emotions. They can also help you get treatment if you have depression. Vocational counselor  Stroke can leave you with disabilities that make it hard to do your job. A vocational counselor can help you return to your job or find a new one. He or she can help you:  · Identify your current skills and prepare a new resume. · Search for a job. · Understand the laws that protect disabled workers. Recreational therapist  A recreational therapist helps you return to doing things you enjoy. This may include the arts, hobbies, sports, or leisure activities. Follow-up care is a key part of your treatment and safety. Be sure to make and go to all appointments, and call your doctor if you are having problems. It's also a good idea to know your test results and keep a list of the medicines you take. Where can you learn more? Go to http://chantal-polo.info/. Enter K715 in the search box to learn more about \"Learning About Stroke Rehabilitation. \"  Current as of: March 20, 2017  Content Version: 11.4  © 1725-6495 Healthwise, Incorporated. Care instructions adapted under license by [employer] (which disclaims liability or warranty for this information). If you have questions about a medical condition or this instruction, always ask your healthcare professional. [address] any warranty or liability for your use of this information.

## 2018-05-22 ENCOUNTER — OFFICE VISIT (OUTPATIENT)
Dept: FAMILY MEDICINE CLINIC | Age: 63
End: 2018-05-22

## 2018-05-22 ENCOUNTER — HOSPITAL ENCOUNTER (OUTPATIENT)
Dept: LAB | Age: 63
Discharge: HOME | End: 2018-05-22
Payer: MEDICARE

## 2018-05-22 VITALS
HEIGHT: 71 IN | DIASTOLIC BLOOD PRESSURE: 94 MMHG | HEART RATE: 54 BPM | SYSTOLIC BLOOD PRESSURE: 164 MMHG | WEIGHT: 220 LBS | RESPIRATION RATE: 16 BRPM | TEMPERATURE: 97.7 F | BODY MASS INDEX: 30.8 KG/M2

## 2018-05-22 DIAGNOSIS — M94.9 DISORDER OF BONE AND CARTILAGE: ICD-10-CM

## 2018-05-22 DIAGNOSIS — S60.512S: ICD-10-CM

## 2018-05-22 DIAGNOSIS — M89.9 DISORDER OF BONE AND CARTILAGE: ICD-10-CM

## 2018-05-22 DIAGNOSIS — R29.898 LEFT ARM WEAKNESS: Primary | ICD-10-CM

## 2018-05-22 DIAGNOSIS — L08.9: ICD-10-CM

## 2018-05-22 DIAGNOSIS — Z86.73 HISTORY OF STROKE: ICD-10-CM

## 2018-05-22 DIAGNOSIS — R01.1 SYSTOLIC MURMUR: ICD-10-CM

## 2018-05-22 DIAGNOSIS — Z79.899 MEDICATION MANAGEMENT: ICD-10-CM

## 2018-05-22 DIAGNOSIS — R29.898 LEFT ARM WEAKNESS: ICD-10-CM

## 2018-05-22 DIAGNOSIS — I10 ESSENTIAL HYPERTENSION: ICD-10-CM

## 2018-05-22 LAB
ALBUMIN SERPL-MCNC: 4.1 G/DL (ref 3.4–5)
ALBUMIN/GLOB SERPL: 1.2 {RATIO} (ref 0.8–1.7)
ALP SERPL-CCNC: 68 U/L (ref 45–117)
ALT SERPL-CCNC: 44 U/L (ref 16–61)
ANION GAP SERPL CALC-SCNC: 6 MMOL/L (ref 3–18)
AST SERPL-CCNC: 38 U/L (ref 15–37)
BASOPHILS # BLD: 0 K/UL (ref 0–0.06)
BASOPHILS NFR BLD: 0 % (ref 0–2)
BILIRUB SERPL-MCNC: 1.1 MG/DL (ref 0.2–1)
BUN SERPL-MCNC: 12 MG/DL (ref 7–18)
BUN/CREAT SERPL: 12 (ref 12–20)
CALCIUM SERPL-MCNC: 9 MG/DL (ref 8.5–10.1)
CHLORIDE SERPL-SCNC: 106 MMOL/L (ref 100–108)
CHOLEST SERPL-MCNC: 94 MG/DL
CO2 SERPL-SCNC: 29 MMOL/L (ref 21–32)
CREAT SERPL-MCNC: 0.97 MG/DL (ref 0.6–1.3)
DIFFERENTIAL METHOD BLD: ABNORMAL
EOSINOPHIL # BLD: 0.2 K/UL (ref 0–0.4)
EOSINOPHIL NFR BLD: 4 % (ref 0–5)
ERYTHROCYTE [DISTWIDTH] IN BLOOD BY AUTOMATED COUNT: 13 % (ref 11.6–14.5)
EST. AVERAGE GLUCOSE BLD GHB EST-MCNC: NORMAL MG/DL
GLOBULIN SER CALC-MCNC: 3.5 G/DL (ref 2–4)
GLUCOSE SERPL-MCNC: 81 MG/DL (ref 74–99)
HBA1C MFR BLD: 4.3 % (ref 4.2–5.6)
HCT VFR BLD AUTO: 45.4 % (ref 36–48)
HDLC SERPL-MCNC: 49 MG/DL (ref 40–60)
HDLC SERPL: 1.9 {RATIO} (ref 0–5)
HGB BLD-MCNC: 14.1 G/DL (ref 13–16)
LDLC SERPL CALC-MCNC: 36.8 MG/DL (ref 0–100)
LIPID PROFILE,FLP: NORMAL
LYMPHOCYTES # BLD: 2.1 K/UL (ref 0.9–3.6)
LYMPHOCYTES NFR BLD: 37 % (ref 21–52)
MCH RBC QN AUTO: 30.4 PG (ref 24–34)
MCHC RBC AUTO-ENTMCNC: 31.1 G/DL (ref 31–37)
MCV RBC AUTO: 97.8 FL (ref 74–97)
MONOCYTES # BLD: 0.6 K/UL (ref 0.05–1.2)
MONOCYTES NFR BLD: 12 % (ref 3–10)
NEUTS SEG # BLD: 2.6 K/UL (ref 1.8–8)
NEUTS SEG NFR BLD: 47 % (ref 40–73)
PLATELET # BLD AUTO: 154 K/UL (ref 135–420)
PMV BLD AUTO: 14.4 FL (ref 9.2–11.8)
POTASSIUM SERPL-SCNC: 4.1 MMOL/L (ref 3.5–5.5)
PROT SERPL-MCNC: 7.6 G/DL (ref 6.4–8.2)
RBC # BLD AUTO: 4.64 M/UL (ref 4.7–5.5)
SODIUM SERPL-SCNC: 141 MMOL/L (ref 136–145)
TRIGL SERPL-MCNC: 41 MG/DL (ref ?–150)
VLDLC SERPL CALC-MCNC: 8.2 MG/DL
WBC # BLD AUTO: 5.5 K/UL (ref 4.6–13.2)

## 2018-05-22 PROCEDURE — 85025 COMPLETE CBC W/AUTO DIFF WBC: CPT | Performed by: INTERNAL MEDICINE

## 2018-05-22 PROCEDURE — 80053 COMPREHEN METABOLIC PANEL: CPT | Performed by: INTERNAL MEDICINE

## 2018-05-22 PROCEDURE — 36415 COLL VENOUS BLD VENIPUNCTURE: CPT | Performed by: INTERNAL MEDICINE

## 2018-05-22 PROCEDURE — 83036 HEMOGLOBIN GLYCOSYLATED A1C: CPT | Performed by: INTERNAL MEDICINE

## 2018-05-22 PROCEDURE — 82306 VITAMIN D 25 HYDROXY: CPT | Performed by: INTERNAL MEDICINE

## 2018-05-22 PROCEDURE — 80061 LIPID PANEL: CPT | Performed by: INTERNAL MEDICINE

## 2018-05-22 RX ORDER — AMLODIPINE BESYLATE 5 MG/1
5 TABLET ORAL DAILY
Qty: 30 TAB | Refills: 0 | Status: SHIPPED | OUTPATIENT
Start: 2018-05-22 | End: 2018-07-16 | Stop reason: SDUPTHER

## 2018-05-22 NOTE — PROGRESS NOTES
Chief Complaint   Patient presents with    Follow-up     1 month f/u    Hand Injury     left, patient was referred to ortho but states he never got an appt. Health Maintenance Due   Topic Date Due    Hepatitis C Screening  1955    DTaP/Tdap/Td series (1 - Tdap) 12/21/1975    FOBT Q 1 YEAR AGE 50-75  12/21/2004    ZOSTER VACCINE AGE 60>  10/21/2014    MEDICARE YEARLY EXAM  03/14/2018       Health Maintenance reviewed       1. Have you been to the ER, urgent care clinic since your last visit? Hospitalized since your last visit? No    2. Have you seen or consulted any other health care providers outside of the [address] [place] [address] since your last visit? Include any pap smears or colon screening.  No
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
## 2018-05-22 NOTE — MR AVS SNAPSHOT
Lizeth Warren 57 [id number] [address] [address] [phone] Patient: Tressa Mack MRN: [id number] :1955 Visit Information Date & Time Provider Department Dept. Phone Encounter #  
 2018  8:15 AM Gabino EscuderoMaria Eugeniaendeanna 77 [id number] Follow-up Instructions Return in about 1 month (around 2018) for Follow Up on blood pressures. Upcoming Health Maintenance Date Due Hepatitis C Screening 1955 DTaP/Tdap/Td series (1 - Tdap) 1975 FOBT Q 1 YEAR AGE 50-75 2004 ZOSTER VACCINE AGE 60> 10/21/2014 MEDICARE YEARLY EXAM 3/14/2018 Influenza Age 5 to Adult 2018 Allergies as of 2018  Review Complete On: 2018 By: Gabino Escudero DO No Known Allergies Current Immunizations  Never Reviewed No immunizations on file. Not reviewed this visit You Were Diagnosed With   
  
 Codes Comments Left arm weakness    -  Primary ICD-10-CM: R29.898 ICD-9-CM: 729.89 History of stroke     ICD-10-CM: Z86.73 
ICD-9-CM: V12.54 Infected abrasion of left hand, sequela     ICD-10-CM: S60.512S, L08.9 ICD-9-CM: 552. 2 Systolic murmur     FRR-86-BT: R01.1 ICD-9-CM: 785.2 Medication management     ICD-10-CM: Z79.899 ICD-9-CM: V58.69 Disorder of bone and cartilage     ICD-10-CM: M89.9, M94.9 ICD-9-CM: 733.90 Essential hypertension     ICD-10-CM: I10 
ICD-9-CM: 401.9 Vitals BP Pulse Temp Resp Height(growth percentile) Weight(growth percentile) (!) 164/94 (BP 1 Location: Right arm, BP Patient Position: Sitting) (!) 54 97.7 °F (36.5 °C) (Oral) 16 5' 11\" (1.803 m) 220 lb (99.8 kg) BMI Smoking Status 30.68 kg/m2 Never Smoker Vitals History BMI and BSA Data Body Mass Index Body Surface Area  
 30.68 kg/m 2 2.24 m 2 Preferred Pharmacy [facility] Name Phone [facility] Your Updated Medication List  
  
   
This list is accurate as of 5/22/18  8:55 AM.  Always use your most recent med list.  
  
  
  
  
 ALEVE 220 mg tablet Generic drug:  naproxen sodium Take 220 mg by mouth two (2) times daily (with meals). amLODIPine 5 mg tablet Commonly known as:  Unknown [place] Take 1 Tab by mouth daily. ergocalciferol 50,000 unit capsule Commonly known as:  ERGOCALCIFEROL Take 1 Cap by mouth every seven (7) days. * MOTRIN  mg tablet Generic drug:  ibuprofen Take  by mouth. * ibuprofen 800 mg tablet Commonly known as:  MOTRIN Take 1 Tab by mouth every eight (8) hours as needed for Pain. * Notice: This list has 2 medication(s) that are the same as other medications prescribed for you. Read the directions carefully, and ask your doctor or other care provider to review them with you. Prescriptions Sent to Pharmacy Refills  
 amLODIPine (NORVASC) 5 mg tablet 0 Sig: Take 1 Tab by mouth daily. Class: Normal  
 Pharmacy: [facility] #: [phone] Route: Oral  
  
Follow-up Instructions Return in about 1 month (around 6/22/2018) for Follow Up on blood pressures. To-Do List   
 05/22/2018 Lab:  HEMOGLOBIN A1C WITH EAG   
  
 05/22/2018 Lab:  VITAMIN D, 25 HYDROXY Around 08/20/2018 Lab:  CBC WITH AUTOMATED DIFF Around 08/20/2018 Lab:  LIPID PANEL Around 08/20/2018 Lab:  METABOLIC PANEL, COMPREHENSIVE Patient Instructions Please contact our office if you have any questions about your visit today. 1.) Start on Amlodipine once a day. 2.) Return in 1 month for follow up. Scrapes (Abrasions): Care Instructions Your Care Instructions Scrapes (abrasions) are wounds where your skin has been rubbed or torn off. Most scrapes do not go deep into the skin, but some may remove several layers of skin. Scrapes usually don't bleed much, but they may ooze pinkish fluid. Scrapes on the head or face may appear worse than they are. They may bleed a lot because of the good blood supply to this area. Most scrapes heal well and may not need a bandage. They usually heal within 3 to 7 days. A large, deep scrape may take 1 to 2 weeks or longer to heal. A scab may form on some scrapes. Follow-up care is a key part of your treatment and safety. Be sure to make and go to all appointments, and call your doctor if you are having problems. It's also a good idea to know your test results and keep a list of the medicines you take. How can you care for yourself at home? · If your doctor told you how to care for your wound, follow your doctor's instructions. If you did not get instructions, follow this general advice: ¨ Wash the scrape with clean water 2 times a day. Don't use hydrogen peroxide or alcohol, which can slow healing. ¨ You may cover the scrape with a thin layer of petroleum jelly, such as Vaseline, and a nonstick bandage. ¨ Apply more petroleum jelly and replace the bandage as needed. · Prop up the injured area on a pillow anytime you sit or lie down during the next 3 days. Try to keep it above the level of your heart. This will help reduce swelling. · Be safe with medicines. Take pain medicines exactly as directed. ¨ If the doctor gave you a prescription medicine for pain, take it as prescribed. ¨ If you are not taking a prescription pain medicine, ask your doctor if you can take an over-the-counter medicine. When should you call for help? Call your doctor now or seek immediate medical care if: 
? · You have signs of infection, such as: 
¨ Increased pain, swelling, warmth, or redness around the scrape. ¨ Red streaks leading from the scrape. ¨ Pus draining from the scrape. ¨ A fever. ? · The scrape starts to bleed, and blood soaks through the bandage. Oozing small amounts of blood is normal. ? Watch closely for changes in your health, and be sure to contact your doctor if the scrape is not getting better each day. Where can you learn more? Go to http://chantal-polo.info/. Enter A374 in the search box to learn more about \"Scrapes (Abrasions): Care Instructions. \" Current as of: March 20, 2017 Content Version: 11.4 © 1532-7020 [employer]. Care instructions adapted under license by [employer] (which disclaims liability or warranty for this information). If you have questions about a medical condition or this instruction, always ask your healthcare professional. [address] any warranty or liability for your use of this information. Amlodipine (By mouth) Amlodipine (tz-RPH-gs-peen) Treats high blood pressure and angina (chest pain). This medicine is a calcium channel blocker. Brand Name(s): Norvasc There may be other brand names for this medicine. When This Medicine Should Not Be Used: This medicine is not right for everyone. Do not use it if you had an allergic reaction to amlodipine. How to Use This Medicine:  
Tablet, Dissolving Tablet · Take your medicine as directed. Your dose may need to be changed several times to find what works best for you. Take this medicine at the same time each day. · Read and follow the patient instructions that come with this medicine. Talk to your doctor or pharmacist if you have any questions. · Missed dose: Take a dose as soon as you remember. If it has been more than 12 hours since you were supposed to take your dose, skip the missed dose and take your next regular dose at the regular time. · Store the medicine in a closed container at room temperature, away from heat, moisture, and direct light. Drugs and Foods to Avoid: Ask your doctor or pharmacist before using any other medicine, including over-the-counter medicines, vitamins, and herbal products. · Some medicines can affect how amlodipine works. Tell your doctor if you are also using any of the following: ¨ Clarithromycin, cyclosporine, diltiazem, itraconazole, ritonavir, sildenafil, simvastatin, tacrolimus Warnings While Using This Medicine: · Tell your doctor if you are pregnant or breastfeeding, or if you have liver disease, heart disease, coronary artery disease, or aortic stenosis. · This medicine could lower your blood pressure too much, especially when you first use it or if you are dehydrated. Stand or sit up slowly if you feel lightheaded or dizzy. · Your doctor will check your progress and the effects of this medicine at regular visits. Keep all appointments. · Do not stop using this medicine without asking your doctor, even if you feel well. This medicine will not cure high blood pressure, but it will help keep it in normal range. You may have to take blood pressure medicine for the rest of your life. · Keep all medicine out of the reach of children. Never share your medicine with anyone. Possible Side Effects While Using This Medicine:  
Call your doctor right away if you notice any of these side effects: · Allergic reaction: Itching or hives, swelling in your face or hands, swelling or tingling in your mouth or throat, chest tightness, trouble breathing · Lightheadedness, dizziness · New or worsening chest pain · Swelling in your hands, ankles, or legs · Trouble breathing, nausea, unusual sweating, fainting If you notice other side effects that you think are caused by this medicine, tell your doctor. Call your doctor for medical advice about side effects. You may report side effects to FDA at 1-692-FDA-9246 © 2017 [facility] Information is for End User's use only and may not be sold, redistributed or otherwise used for commercial purposes. The above information is an  only. It is not intended as medical advice for individual conditions or treatments. Talk to your doctor, nurse or pharmacist before following any medical regimen to see if it is safe and effective for you. Introducing hospitals & HEALTH SERVICES! [facility] introduces [facility] patient portal. Now you can access parts of your medical record, email your doctor's office, and request medication refills online. 1. In your internet browser, go to https://[employer]. [employer] 2. Click on the First Time User? Click Here link in the Sign In box. You will see the New Member Sign Up page. 3. Enter your [facility] Access Code exactly as it appears below. You will not need to use this code after youve completed the sign-up process. If you do not sign up before the expiration date, you must request a new code. · [facility] [facility] Access Code: [email or web address] Expires: 6/21/2018  1:36 PM 
 
4. Enter the last four digits of your Social Security Number (xxxx) and Date of Birth (mm/dd/yyyy) as indicated and click Submit. You will be taken to the next sign-up page. 5. Create a [facility] ID. This will be your [facility] login ID and cannot be changed, so think of one that is secure and easy to remember. 6. Create a [facility] password. You can change your password at any time. 7. Enter your Password Reset Question and Answer. This can be used at a later time if you forget your password. 8. Enter your e-mail address. You will receive e-mail notification when new information is available in [address] 9. Click Sign Up. You can now view and download portions of your medical record. 10. Click the Download Summary menu link to download a portable copy of your medical information. If you have questions, please visit the Frequently Asked Questions section of the [employer] website. Remember, [employer] is NOT to be used for urgent needs. For medical emergencies, dial 911. Now available from your iPhone and Android! Please provide this summary of care documentation to your next provider. Your primary care clinician is listed as Nila Arellano. If you have any questions after today's visit, please call [phone].

## 2018-05-22 NOTE — PATIENT INSTRUCTIONS
Please contact our office if you have any questions about your visit today. 1.) Start on Amlodipine once a day. 2.) Return in 1 month for follow up. Scrapes (Abrasions): Care Instructions  Your Care Instructions  Scrapes (abrasions) are wounds where your skin has been rubbed or torn off. Most scrapes do not go deep into the skin, but some may remove several layers of skin. Scrapes usually don't bleed much, but they may ooze pinkish fluid. Scrapes on the head or face may appear worse than they are. They may bleed a lot because of the good blood supply to this area. Most scrapes heal well and may not need a bandage. They usually heal within 3 to 7 days. A large, deep scrape may take 1 to 2 weeks or longer to heal. A scab may form on some scrapes. Follow-up care is a key part of your treatment and safety. Be sure to make and go to all appointments, and call your doctor if you are having problems. It's also a good idea to know your test results and keep a list of the medicines you take. How can you care for yourself at home? · If your doctor told you how to care for your wound, follow your doctor's instructions. If you did not get instructions, follow this general advice:  ¨ Wash the scrape with clean water 2 times a day. Don't use hydrogen peroxide or alcohol, which can slow healing. ¨ You may cover the scrape with a thin layer of petroleum jelly, such as Vaseline, and a nonstick bandage. ¨ Apply more petroleum jelly and replace the bandage as needed. · Prop up the injured area on a pillow anytime you sit or lie down during the next 3 days. Try to keep it above the level of your heart. This will help reduce swelling. · Be safe with medicines. Take pain medicines exactly as directed. ¨ If the doctor gave you a prescription medicine for pain, take it as prescribed. ¨ If you are not taking a prescription pain medicine, ask your doctor if you can take an over-the-counter medicine.   When should you call for help? Call your doctor now or seek immediate medical care if:  ? · You have signs of infection, such as:  ¨ Increased pain, swelling, warmth, or redness around the scrape. ¨ Red streaks leading from the scrape. ¨ Pus draining from the scrape. ¨ A fever. ? · The scrape starts to bleed, and blood soaks through the bandage. Oozing small amounts of blood is normal.   ? Watch closely for changes in your health, and be sure to contact your doctor if the scrape is not getting better each day. Where can you learn more? Go to http://chantal-polo.info/. Enter A374 in the search box to learn more about \"Scrapes (Abrasions): Care Instructions. \"  Current as of: March 20, 2017  Content Version: 11.4  © 5996-9427 [employer]. Care instructions adapted under license by [employer] (which disclaims liability or warranty for this information). If you have questions about a medical condition or this instruction, always ask your healthcare professional. [address] any warranty or liability for your use of this information. Amlodipine (By mouth)   Amlodipine (sx-TGQ-gn-peen)  Treats high blood pressure and angina (chest pain). This medicine is a calcium channel blocker. Brand Name(s): Norvasc   There may be other brand names for this medicine. When This Medicine Should Not Be Used: This medicine is not right for everyone. Do not use it if you had an allergic reaction to amlodipine. How to Use This Medicine:   Tablet, Dissolving Tablet  · Take your medicine as directed. Your dose may need to be changed several times to find what works best for you. Take this medicine at the same time each day. · Read and follow the patient instructions that come with this medicine. Talk to your doctor or pharmacist if you have any questions. · Missed dose: Take a dose as soon as you remember.  If it has been more than 12 hours since you were supposed to take your dose, skip the missed dose and take your next regular dose at the regular time. · Store the medicine in a closed container at room temperature, away from heat, moisture, and direct light. Drugs and Foods to Avoid:   Ask your doctor or pharmacist before using any other medicine, including over-the-counter medicines, vitamins, and herbal products. · Some medicines can affect how amlodipine works. Tell your doctor if you are also using any of the following:   ¨ Clarithromycin, cyclosporine, diltiazem, itraconazole, ritonavir, sildenafil, simvastatin, tacrolimus  Warnings While Using This Medicine:   · Tell your doctor if you are pregnant or breastfeeding, or if you have liver disease, heart disease, coronary artery disease, or aortic stenosis. · This medicine could lower your blood pressure too much, especially when you first use it or if you are dehydrated. Stand or sit up slowly if you feel lightheaded or dizzy. · Your doctor will check your progress and the effects of this medicine at regular visits. Keep all appointments. · Do not stop using this medicine without asking your doctor, even if you feel well. This medicine will not cure high blood pressure, but it will help keep it in normal range. You may have to take blood pressure medicine for the rest of your life. · Keep all medicine out of the reach of children. Never share your medicine with anyone. Possible Side Effects While Using This Medicine:   Call your doctor right away if you notice any of these side effects:  · Allergic reaction: Itching or hives, swelling in your face or hands, swelling or tingling in your mouth or throat, chest tightness, trouble breathing  · Lightheadedness, dizziness  · New or worsening chest pain  · Swelling in your hands, ankles, or legs  · Trouble breathing, nausea, unusual sweating, fainting  If you notice other side effects that you think are caused by this medicine, tell your doctor.    Call your doctor for medical advice about side effects. You may report side effects to FDA at 0-488-FCU-4034  © 2017 [facility] Information is for End User's use only and may not be sold, redistributed or otherwise used for commercial purposes. The above information is an  only. It is not intended as medical advice for individual conditions or treatments. Talk to your doctor, nurse or pharmacist before following any medical regimen to see if it is safe and effective for you.

## 2018-05-23 LAB — 25(OH)D3 SERPL-MCNC: 15.9 NG/ML (ref 30–100)

## 2018-07-16 ENCOUNTER — OFFICE VISIT (OUTPATIENT)
Dept: FAMILY MEDICINE CLINIC | Age: 63
End: 2018-07-16

## 2018-07-16 VITALS
SYSTOLIC BLOOD PRESSURE: 145 MMHG | DIASTOLIC BLOOD PRESSURE: 89 MMHG | HEART RATE: 61 BPM | HEIGHT: 71 IN | OXYGEN SATURATION: 97 % | TEMPERATURE: 97.6 F | WEIGHT: 218.8 LBS | RESPIRATION RATE: 16 BRPM | BODY MASS INDEX: 30.63 KG/M2

## 2018-07-16 DIAGNOSIS — Z11.59 ENCOUNTER FOR HEPATITIS C SCREENING TEST FOR LOW RISK PATIENT: ICD-10-CM

## 2018-07-16 DIAGNOSIS — I10 ESSENTIAL HYPERTENSION: Primary | ICD-10-CM

## 2018-07-16 DIAGNOSIS — E55.9 VITAMIN D DEFICIENCY: ICD-10-CM

## 2018-07-16 DIAGNOSIS — Z29.9 PREVENTIVE MEASURE: ICD-10-CM

## 2018-07-16 RX ORDER — AMLODIPINE BESYLATE 10 MG/1
10 TABLET ORAL
Qty: 30 TAB | Refills: 1 | Status: SHIPPED | OUTPATIENT
Start: 2018-07-16 | End: 2018-11-12 | Stop reason: SDUPTHER

## 2018-07-16 RX ORDER — ERGOCALCIFEROL 1.25 MG/1
50000 CAPSULE ORAL
Qty: 12 CAP | Refills: 3 | Status: SHIPPED | OUTPATIENT
Start: 2018-07-16 | End: 2018-11-12

## 2018-07-16 NOTE — PATIENT INSTRUCTIONS
Please contact our office if you have any questions about your visit today. 1.) Please call the hand specialist as soon as possible. 2.) Please let us know if you need any further info for physical therapy. 3.) Your Amlodipine has been increased to 10 mg.     4.) Please check your blood pressure daily in the evening. Goal is to be below 130/80 regularly. 5.) Please return in 1 month for Medicare Wellness and Follow up on blood pressure medication. We will do labs on that day.
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
## 2018-07-16 NOTE — MR AVS SNAPSHOT
Nabilaalirio Kearney 
 
 
 [address] [facility] [address] 
[phone] Patient: Francisco Javier Salazar MRN: [id number] :1955 Visit Information Date & Time Provider Department Dept. Phone Encounter #  
 2018  8:45 AM Maria Eugenia Shi 77 [id number] Follow-up Instructions Return in about 1 month (around 2018) for Medicare Wellness . Upcoming Health Maintenance Date Due Hepatitis C Screening 1955 DTaP/Tdap/Td series (1 - Tdap) 1975 FOBT Q 1 YEAR AGE 50-75 2004 ZOSTER VACCINE AGE 60> 10/21/2014 MEDICARE YEARLY EXAM 3/14/2018 Influenza Age 5 to Adult 2018 Allergies as of 2018  Review Complete On: 5968 By: Cas Rodriguez. Jc Patel LPN No Known Allergies Current Immunizations  Never Reviewed No immunizations on file. Not reviewed this visit You Were Diagnosed With   
  
 Codes Comments Essential hypertension    -  Primary ICD-10-CM: I10 
ICD-9-CM: 401.9 Vitamin D deficiency     ICD-10-CM: E55.9 ICD-9-CM: 268.9 Preventive measure     ICD-10-CM: Z29.9 ICD-9-CM: V07.9 Encounter for hepatitis C screening test for low risk patient     ICD-10-CM: Z11.59 
ICD-9-CM: V73.89 Vitals BP Pulse Temp Resp Height(growth percentile) Weight(growth percentile) 145/89 (BP 1 Location: Right arm, BP Patient Position: Sitting) 61 97.6 °F (36.4 °C) (Oral) 16 5' 11\" (1.803 m) 218 lb 12.8 oz (99.2 kg) SpO2 BMI Smoking Status 97% 30.52 kg/m2 Never Smoker BMI and BSA Data Body Mass Index Body Surface Area 30.52 kg/m 2 2.23 m 2 Preferred Pharmacy Pharmacy Name [facility] [address] [place] [address] AT [facility] [phone] Your Updated Medication List  
  
   
 This list is accurate as of 7/16/18  9:30 AM.  Always use your most recent med list.  
  
  
  
  
 ALEVE 220 mg tablet Generic drug:  naproxen sodium Take 220 mg by mouth two (2) times daily (with meals). amLODIPine 10 mg tablet Commonly known as:  Domi Julianna Take 1 Tab by mouth nightly.  
  
 ergocalciferol 50,000 unit capsule Commonly known as:  ERGOCALCIFEROL Take 1 Cap by mouth every seven (7) days. * MOTRIN  mg tablet Generic drug:  ibuprofen Take  by mouth. * ibuprofen 800 mg tablet Commonly known as:  MOTRIN Take 1 Tab by mouth every eight (8) hours as needed for Pain. * Notice: This list has 2 medication(s) that are the same as other medications prescribed for you. Read the directions carefully, and ask your doctor or other care provider to review them with you. Prescriptions Sent to Pharmacy Refills  
 ergocalciferol (ERGOCALCIFEROL) 50,000 unit capsule 3 Sig: Take 1 Cap by mouth every seven (7) days. Class: Normal  
 Pharmacy: [facility] [address] [place] [address] [facility] Ph #: [phone] Route: Oral  
 amLODIPine (NORVASC) 10 mg tablet 1 Sig: Take 1 Tab by mouth nightly. Class: Normal  
 Pharmacy: [facility] [address] [place] [address] Ph #: [phone] Route: Oral  
  
Follow-up Instructions Return in about 1 month (around 8/16/2018) for Medicare Wellness . To-Do List   
 07/16/2018 Lab:  HEPATITIS C AB   
  
 07/16/2018 Lab:  VITAMIN D, 25 HYDROXY Around 10/14/2018 Lab:  LIPID PANEL Around 10/14/2018 Lab:  METABOLIC PANEL, COMPREHENSIVE Patient Instructions Please contact our office if you have any questions about your visit today. 1.) Please call the hand specialist as soon as possible. 2.) Please let us know if you need any further info for physical therapy. 3.) Your Amlodipine has been increased to 10 mg.  
 
4.) Please check your blood pressure daily in the evening. Goal is to be below 130/80 regularly. 5.) Please return in 1 month for Medicare Wellness and Follow up on blood pressure medication. We will do labs on that day. Introducing [facility] SERVICES! [employer] introduces [employer] patient portal. Now you can access parts of your medical record, email your doctor's office, and request medication refills online. 1. In your internet browser, go to [email or web address] 2. Click on the First Time User? Click Here link in the Sign In box. You will see the New Member Sign Up page. 3. Enter your [employer] Access Code exactly as it appears below. You will not need to use this code after youve completed the sign-up process. If you do not sign up before the expiration date, you must request a new code. · [employer] Access Code: [email or web address] Expires: 10/14/2018  9:30 AM 
 
4. Enter the last four digits of your Social Security Number (xxxx) and Date of Birth (mm/dd/yyyy) as indicated and click Submit. You will be taken to the next sign-up page. 5. Create a [employer] TV ID. This will be your [employer] login ID and cannot be changed, so think of one that is secure and easy to remember. 6. Create a [employer] password. You can change your password at any time. 7. Enter your Password Reset Question and Answer. This can be used at a later time if you forget your password. 8. Enter your e-mail address. You will receive e-mail notification when new information is available in [address] 9. Click Sign Up. You can now view and download portions of your medical record. 10. Click the Download Summary menu link to download a portable copy of your medical information. If you have questions, please visit the Frequently Asked Questions section of the [employer] website. Remember, bepretty is NOT to be used for urgent needs. For medical emergencies, dial 911. Now available from your iPhone and Android! Please provide this summary of care documentation to your next provider. Your primary care clinician is listed as Jacqueline Quinones. If you have any questions after today's visit, please call [phone].

## 2018-07-16 NOTE — PROGRESS NOTES
Chief Complaint   Patient presents with    Follow-up     states that he did not see the specialist    Hypertension     managed with meds     1. Have you been to the ER, urgent care clinic since your last visit? Hospitalized since your last visit? No    2. Have you seen or consulted any other health care providers outside of the [address] [place] [address] since your last visit? Include any pap smears or colon screening.  No

## 2018-11-12 ENCOUNTER — OFFICE VISIT (OUTPATIENT)
Dept: FAMILY MEDICINE CLINIC | Age: 63
End: 2018-11-12

## 2018-11-12 ENCOUNTER — HOSPITAL ENCOUNTER (OUTPATIENT)
Dept: LAB | Age: 63
Discharge: HOME | End: 2018-11-12
Payer: MEDICARE

## 2018-11-12 VITALS
DIASTOLIC BLOOD PRESSURE: 88 MMHG | HEIGHT: 71 IN | HEART RATE: 58 BPM | OXYGEN SATURATION: 100 % | SYSTOLIC BLOOD PRESSURE: 147 MMHG | WEIGHT: 215 LBS | BODY MASS INDEX: 30.1 KG/M2 | RESPIRATION RATE: 16 BRPM | TEMPERATURE: 97 F

## 2018-11-12 DIAGNOSIS — Z11.59 NEED FOR HEPATITIS C SCREENING TEST: ICD-10-CM

## 2018-11-12 DIAGNOSIS — Z71.89 ACP (ADVANCE CARE PLANNING): ICD-10-CM

## 2018-11-12 DIAGNOSIS — N28.1 RENAL CYST: Primary | ICD-10-CM

## 2018-11-12 DIAGNOSIS — Z12.5 SCREENING FOR PROSTATE CANCER: ICD-10-CM

## 2018-11-12 DIAGNOSIS — Z23 ENCOUNTER FOR IMMUNIZATION: ICD-10-CM

## 2018-11-12 DIAGNOSIS — R29.898 LEFT ARM WEAKNESS: ICD-10-CM

## 2018-11-12 DIAGNOSIS — M24.542 CONTRACTURE OF LEFT HAND: ICD-10-CM

## 2018-11-12 DIAGNOSIS — I10 ESSENTIAL HYPERTENSION: ICD-10-CM

## 2018-11-12 DIAGNOSIS — Z86.73 HISTORY OF STROKE: ICD-10-CM

## 2018-11-12 DIAGNOSIS — Z00.00 ENCOUNTER FOR MEDICARE ANNUAL WELLNESS EXAM: ICD-10-CM

## 2018-11-12 LAB
ALBUMIN SERPL-MCNC: 4.6 G/DL (ref 3.4–5)
ALBUMIN/GLOB SERPL: 1.4 {RATIO} (ref 0.8–1.7)
ALP SERPL-CCNC: 70 U/L (ref 45–117)
ALT SERPL-CCNC: 45 U/L (ref 16–61)
ANION GAP SERPL CALC-SCNC: 7 MMOL/L (ref 3–18)
AST SERPL-CCNC: 39 U/L (ref 15–37)
BASOPHILS # BLD: 0 K/UL (ref 0–0.1)
BASOPHILS NFR BLD: 0 % (ref 0–2)
BILIRUB SERPL-MCNC: 0.9 MG/DL (ref 0.2–1)
BUN SERPL-MCNC: 14 MG/DL (ref 7–18)
BUN/CREAT SERPL: 18 (ref 12–20)
CALCIUM SERPL-MCNC: 8.9 MG/DL (ref 8.5–10.1)
CHLORIDE SERPL-SCNC: 105 MMOL/L (ref 100–108)
CHOLEST SERPL-MCNC: 101 MG/DL
CO2 SERPL-SCNC: 30 MMOL/L (ref 21–32)
CREAT SERPL-MCNC: 0.79 MG/DL (ref 0.6–1.3)
DIFFERENTIAL METHOD BLD: ABNORMAL
EOSINOPHIL # BLD: 0.2 K/UL (ref 0–0.4)
EOSINOPHIL NFR BLD: 3 % (ref 0–5)
ERYTHROCYTE [DISTWIDTH] IN BLOOD BY AUTOMATED COUNT: 12.9 % (ref 11.6–14.5)
GLOBULIN SER CALC-MCNC: 3.2 G/DL (ref 2–4)
GLUCOSE SERPL-MCNC: 87 MG/DL (ref 74–99)
HCT VFR BLD AUTO: 48.6 % (ref 36–48)
HDLC SERPL-MCNC: 56 MG/DL (ref 40–60)
HDLC SERPL: 1.8 {RATIO} (ref 0–5)
HGB BLD-MCNC: 14.7 G/DL (ref 13–16)
LDLC SERPL CALC-MCNC: 37 MG/DL (ref 0–100)
LIPID PROFILE,FLP: NORMAL
LYMPHOCYTES # BLD: 1.9 K/UL (ref 0.9–3.6)
LYMPHOCYTES NFR BLD: 40 % (ref 21–52)
MCH RBC QN AUTO: 29.6 PG (ref 24–34)
MCHC RBC AUTO-ENTMCNC: 30.2 G/DL (ref 31–37)
MCV RBC AUTO: 97.8 FL (ref 74–97)
MONOCYTES # BLD: 0.6 K/UL (ref 0.05–1.2)
MONOCYTES NFR BLD: 13 % (ref 3–10)
NEUTS SEG # BLD: 2 K/UL (ref 1.8–8)
NEUTS SEG NFR BLD: 44 % (ref 40–73)
PLATELET # BLD AUTO: 166 K/UL (ref 135–420)
PMV BLD AUTO: 14.8 FL (ref 9.2–11.8)
POTASSIUM SERPL-SCNC: 4.3 MMOL/L (ref 3.5–5.5)
PROT SERPL-MCNC: 7.8 G/DL (ref 6.4–8.2)
PSA SERPL-MCNC: 1.1 NG/ML (ref 0–4)
RBC # BLD AUTO: 4.97 M/UL (ref 4.7–5.5)
SODIUM SERPL-SCNC: 142 MMOL/L (ref 136–145)
TRIGL SERPL-MCNC: 40 MG/DL (ref ?–150)
VLDLC SERPL CALC-MCNC: 8 MG/DL
WBC # BLD AUTO: 4.7 K/UL (ref 4.6–13.2)

## 2018-11-12 PROCEDURE — 84153 ASSAY OF PSA TOTAL: CPT

## 2018-11-12 PROCEDURE — 85025 COMPLETE CBC W/AUTO DIFF WBC: CPT

## 2018-11-12 PROCEDURE — 80061 LIPID PANEL: CPT

## 2018-11-12 PROCEDURE — 80053 COMPREHEN METABOLIC PANEL: CPT

## 2018-11-12 PROCEDURE — 86803 HEPATITIS C AB TEST: CPT

## 2018-11-12 RX ORDER — AMLODIPINE BESYLATE 10 MG/1
10 TABLET ORAL
Qty: 90 TAB | Refills: 1 | Status: SHIPPED | OUTPATIENT
Start: 2018-11-12 | End: 2019-06-11 | Stop reason: SDUPTHER

## 2018-11-12 RX ORDER — NAPROXEN 500 MG/1
500 TABLET ORAL
COMMUNITY
Start: 2018-11-02 | End: 2019-09-03

## 2018-11-12 NOTE — ACP (ADVANCE CARE PLANNING)
Advance Care Planning (ACP) Provider Note - Comprehensive     Date of ACP Conversation: 11/12/18  Persons included in Conversation:  patient and family  Length of ACP Conversation in minutes:  16 minutes    Authorized Decision Maker (if patient is incapable of making informed decisions): This person is:  Patient's daughter would be authorized decision maker. Patient will discuss with the daughter and fill out forms given. These will be brought back for scanning into the EMR chart. General ACP for ALL Patients with Decision Making Capacity:   Importance of advance care planning, including choosing a healthcare agent to communicate patient's healthcare decisions if patient lost the ability to make decisions, such as after a sudden illness or accident  Understanding of the healthcare agent role was assessed and information provided  Exploration of values, goals, and preferences if recovery is not expected, even with continued medical treatment in the event of: Imminent death  Severe, permanent brain injury  \"In these circumstances, what matters most to you? \"  Care focused more on comfort or quality of life.   Opportunity offered to explore how cultural, Zoroastrian, spiritual, or personal beliefs would affect decisions for future care     Interventions Provided:  Recommended completion of Advance Directive form after review of ACP materials and conversation with prospective healthcare agent      Natalie Lewis MD
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
## 2018-11-12 NOTE — PROGRESS NOTES
Patient was administered seasonal influenza vaccine via left deltoid without difficulty. Patient was educated on side effects and verbalized understanding. Patient waited 15 minutes and no side effects observed. No acute distress noted or verbalized.

## 2018-11-12 NOTE — PATIENT INSTRUCTIONS
Medicare Part B Preventive Services Limitations Recommendation Scheduled Bone Mass Measurement 
(age 72 & older, biennial) Requires diagnosis related to osteoporosis or estrogen deficiency. Biennial benefit unless patient has history of long-term glucocorticoid tx or baseline is needed because initial test was by other method n/a Cardiovascular Screening Blood Tests (every 5 years) Total cholesterol, HDL, Triglycerides and ECG Order blood work  as a panel if possible and  for adults with routine risk  an electrocardiogram (ECG) at intervals determined by the provider. Colorectal Cancer Screening 
-Fecal occult blood test (annual) -Flexible sigmoidoscopy (5y) 
-Screening colonoscopy (10y) -Barium Enema Colorectal cancer screening should be done for adults age 54-65 with no increased risk factors for colorectal cancer. There are a number of acceptable methods of screening for this type of cancer. Each test has its own benefits and drawbacks. Discuss with your provider what is most appropriate for you during your annual wellness visit. The different tests include: colonoscopy (considered the best screening method), a fecal occult blood test, a fecal DNA test, and sigmoidoscopy Done per patient Counseling to Prevent Tobacco Use (up to 8 sessions per year) - Counseling greater than 3 and up to 10 minutes - Counseling greater than 10 minutes Patients must be asymptomatic of tobacco-related conditions to receive as preventive service n/a Diabetes Screening Tests (at least every 3 years, Medicare covers annually or at 6-month intervals for prediabetic patients) Fasting blood sugar (FBS) or glucose tolerance test (GTT) All adults age 38-68 who are overweight should have a diabetes screening test once every three years. -Other screening tests & preventive services for persons with diabetes include: an eye exam to screen for diabetic retinopathy, a kidney function test, a foot exam, and stricter control over your cholesterol. n/a Diabetes Self-Management Training (DSMT) (no USPSTF recommendation) Requires referral by treating physician for patient with diabetes or renal disease. 10 hours of initial DSMT session of no less than 30 minutes each in a continuous 12-month period. 2 hours of follow-up DSMT in subsequent years. Glaucoma Screening (no USPSTF recommendation) Diabetes mellitus, family history, , age 48 or over,  American, age 72 or over n/a Human Immunodeficiency Virus (HIV) Screening (annually for increased risk patients) HIV-1 and HIV-2 by EIA, DONOVAN, rapid antibody test, or oral mucosa transudate Patient must be at increased risk for HIV infection per USPSTF guidelines or pregnant. Tests covered annually for patients at increased risk. Pregnant patients may receive up to 3 test during pregnancy. n/a Medical Nutrition Therapy (MNT) (for diabetes or renal disease not recommended schedule) Requires referral by treating physician for patient with diabetes or renal disease. Can be provided in same year as diabetes self-management training (DSMT), and CMS recommends medical nutrition therapy take place after DSMT. Up to 3 hours for initial year and 2 hours in subsequent years. n/a Prostate Cancer Screening (annually up to age 76) - Digital rectal exam (MAYRA) - Prostate specific antigen (PSA) Annually (age 48 or over), MAYRA not paid separately when covered E/M service is provided on same date Men up to age 76 may need a screening blood test for prostate cancer at certain intervals, depending on their personal and family history. This decision is between the patient and his provider. Due Seasonal Influenza Vaccination (annually) All adults should have a flu vaccine yearly  Due 
 Pneumococcal Vaccination (once after 72) All adults  over age 72 should receive the recommended pneumonia vaccines. Current USPSTF guidelines recommend a series of two vaccines for the best pneumonia protection. Done per patient Hepatitis B Vaccinations (if medium/high risk) Medium/high risk factors:  End-stage renal disease, Hemophiliacs who received Factor VIII or IX concentrates, Clients of institutions for the mentally retarded, Persons who live in the same house as a HepB virus carrier, Homosexual men, Illicit injectable drug abusers. n/a Shingles Vaccination A shingles vaccine is also recommended once in a lifetime after age 61 Due Ultrasound Screening for Abdominal Aortic Aneurysm (AAA) (once) An Abdominal Aortic Aneurysm (AAA) Screening is recommended for men age 73-68 who has ever smoked in their lifetime. of the following criteria: 
- Men who are 73-68 years old and have smoked more than 100 cigarettes in their lifetime. 
-Anyone with a FH of AAA 
-Anyone recommended for screening by USPSTF n/a Hep C All adults born between 80 and 1965 should be screened once for Hepatitis C Due Tetanus  All adults should have a tetanus vaccine every 10 years Done per Patient Body Mass Index: Care Instructions Your Care Instructions Body mass index (BMI) can help you see if your weight is raising your risk for health problems. It uses a formula to compare how much you weigh with how tall you are. · A BMI lower than 18.5 is considered underweight. · A BMI between 18.5 and 24.9 is considered healthy. · A BMI between 25 and 29.9 is considered overweight. A BMI of 30 or higher is considered obese. If your BMI is in the normal range, it means that you have a lower risk for weight-related health problems. If your BMI is in the overweight or obese range, you may be at increased risk for weight-related health problems, such as high blood pressure, heart disease, stroke, arthritis or joint pain, and diabetes. If your BMI is in the underweight range, you may be at increased risk for health problems such as fatigue, lower protection (immunity) against illness, muscle loss, bone loss, hair loss, and hormone problems. BMI is just one measure of your risk for weight-related health problems. You may be at higher risk for health problems if you are not active, you eat an unhealthy diet, or you drink too much alcohol or use tobacco products. Follow-up care is a key part of your treatment and safety. Be sure to make and go to all appointments, and call your doctor if you are having problems. It's also a good idea to know your test results and keep a list of the medicines you take. How can you care for yourself at home? · Practice healthy eating habits. This includes eating plenty of fruits, vegetables, whole grains, lean protein, and low-fat dairy. · If your doctor recommends it, get more exercise. Walking is a good choice. Bit by bit, increase the amount you walk every day. Try for at least 30 minutes on most days of the week. · Do not smoke. Smoking can increase your risk for health problems. If you need help quitting, talk to your doctor about stop-smoking programs and medicines. These can increase your chances of quitting for good. · Limit alcohol to 2 drinks a day for men and 1 drink a day for women. Too much alcohol can cause health problems. If you have a BMI higher than 25 · Your doctor may do other tests to check your risk for weight-related health problems. This may include measuring the distance around your waist. A waist measurement of more than 40 inches in men or 35 inches in women can increase the risk of weight-related health problems. · Talk with your doctor about steps you can take to stay healthy or improve your health. You may need to make lifestyle changes to lose weight and stay healthy, such as changing your diet and getting regular exercise. If you have a BMI lower than 18.5 · Your doctor may do other tests to check your risk for health problems. · Talk with your doctor about steps you can take to stay healthy or improve your health. You may need to make lifestyle changes to gain or maintain weight and stay healthy, such as getting more healthy foods in your diet and doing exercises to build muscle. Where can you learn more? Go to http://chantal-pool.info/. Enter S176 in the search box to learn more about \"Body Mass Index: Care Instructions. \" Current as of: October 13, 2016 Content Version: 11.4 © 4248-6643 [employer]. Care instructions adapted under license by [employer] (which disclaims liability or warranty for this information). If you have questions about a medical condition or this instruction, always ask your healthcare professional. [address] any warranty or liability for your use of this information.

## 2018-11-12 NOTE — PROGRESS NOTES
Chief Complaint Patient presents with  Abdominal Pain  
  s/p [facility] ER visit, 11/2/18  Hand Pain  
  s/p stroke in 1975 1. Have you been to the ER, urgent care clinic since your last visit? Hospitalized since your last visit? Yes When: 11/2/18 Where: [facility] Reason for visit: Abdominal pain and cyst found on kidney 2. Have you seen or consulted any other health care providers outside of the [address] [place] [address] since your last visit? Include any pap smears or colon screening.  No

## 2018-11-13 DIAGNOSIS — R76.8 POSITIVE HEPATITIS C ANTIBODY TEST: Primary | ICD-10-CM

## 2018-11-13 LAB
HCV AB SER IA-ACNC: >11 INDEX
HCV AB SERPL QL IA: POSITIVE
HCV COMMENT,HCGAC: ABNORMAL

## 2018-11-13 NOTE — PROGRESS NOTES
Please let patient know his hepatitis C test result is positive. I will place a referral to the gastroenterologist for follow-up care.   Marlen Tellez MD

## 2018-11-21 ENCOUNTER — TELEPHONE (OUTPATIENT)
Dept: FAMILY MEDICINE CLINIC | Age: 63
End: 2018-11-21

## 2018-11-21 NOTE — TELEPHONE ENCOUNTER
Inocencio Castro called to see if [facility] paper work was ready for pickup. . She brought it on 11/14th and was told about a week. The deadline is Tuesday 11/27 so she needs it by Monday, November 26th.   Please call her and advise  [phone]    Thanks

## 2018-11-21 NOTE — TELEPHONE ENCOUNTER
Augusta Tucker in regards to [facility] and she was was unreachable at this time. A voicemail left for her to come in and  the [facility] papers.

## 2018-12-10 ENCOUNTER — OFFICE VISIT (OUTPATIENT)
Dept: FAMILY MEDICINE CLINIC | Age: 63
End: 2018-12-10

## 2018-12-10 VITALS
OXYGEN SATURATION: 99 % | RESPIRATION RATE: 18 BRPM | BODY MASS INDEX: 29.96 KG/M2 | DIASTOLIC BLOOD PRESSURE: 70 MMHG | TEMPERATURE: 98.5 F | HEART RATE: 66 BPM | SYSTOLIC BLOOD PRESSURE: 115 MMHG | HEIGHT: 71 IN | WEIGHT: 214 LBS

## 2018-12-10 DIAGNOSIS — R76.8 HEPATITIS C ANTIBODY TEST POSITIVE: ICD-10-CM

## 2018-12-10 DIAGNOSIS — M24.549 CONTRACTURE OF HAND: Primary | ICD-10-CM

## 2018-12-10 DIAGNOSIS — I10 ESSENTIAL HYPERTENSION: ICD-10-CM

## 2018-12-10 RX ORDER — ACETAMINOPHEN 500 MG
500 TABLET ORAL
COMMUNITY
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
## 2018-12-11 ENCOUNTER — OFFICE VISIT (OUTPATIENT)
Dept: UROLOGY | Age: 63
End: 2018-12-11

## 2018-12-11 ENCOUNTER — HOSPITAL ENCOUNTER (OUTPATIENT)
Dept: LAB | Age: 63
Discharge: HOME | End: 2018-12-11
Payer: MEDICARE

## 2018-12-11 VITALS
DIASTOLIC BLOOD PRESSURE: 80 MMHG | SYSTOLIC BLOOD PRESSURE: 135 MMHG | BODY MASS INDEX: 29.85 KG/M2 | HEART RATE: 63 BPM | HEIGHT: 71 IN | OXYGEN SATURATION: 97 %

## 2018-12-11 DIAGNOSIS — N28.89 RENAL MASS: ICD-10-CM

## 2018-12-11 DIAGNOSIS — N28.9 RENAL LESION: Primary | ICD-10-CM

## 2018-12-11 DIAGNOSIS — N40.0 BENIGN PROSTATIC HYPERPLASIA, UNSPECIFIED WHETHER LOWER URINARY TRACT SYMPTOMS PRESENT: ICD-10-CM

## 2018-12-11 LAB
BILIRUB UR QL STRIP: NEGATIVE
GLUCOSE UR-MCNC: NEGATIVE MG/DL
KETONES P FAST UR STRIP-MCNC: NEGATIVE MG/DL
PH UR STRIP: 7 [PH] (ref 4.6–8)
PROT UR QL STRIP: NEGATIVE
PSA SERPL-MCNC: 1.1 NG/ML (ref 0–4)
SP GR UR STRIP: 1.01 (ref 1–1.03)
UA UROBILINOGEN AMB POC: NORMAL (ref 0.2–1)
URINALYSIS CLARITY POC: CLEAR
URINALYSIS COLOR POC: YELLOW
URINE BLOOD POC: NEGATIVE
URINE LEUKOCYTES POC: NEGATIVE
URINE NITRITES POC: NEGATIVE

## 2018-12-11 PROCEDURE — 84153 ASSAY OF PSA TOTAL: CPT

## 2018-12-11 RX ORDER — ERGOCALCIFEROL 1.25 MG/1
50000 CAPSULE ORAL
COMMUNITY
Start: 2016-12-08 | End: 2019-03-11 | Stop reason: SDUPTHER

## 2018-12-11 NOTE — PROGRESS NOTES
Mr. Jw White has a reminder for a \"due or due soon\" health maintenance. I have asked that he contact his primary care provider for follow-up on this health maintenance. RBV Per Dr. Esthela Saned draw lab today for PSA for BPH.

## 2018-12-11 NOTE — PATIENT INSTRUCTIONS
Learning About Your Kidneys  What do your kidneys do? Your kidneys are two bean-shaped organs. Each one is about the size of your fist. They are part of your internal organs. They are located in the back on either side of your spine. Your kidneys are protected by your ribs. The kidneys do three main things in your body:  · Remove wastes. They filter waste products and extra fluid out of your blood. These wastes leave your body through your urine. · Balance the fluids and chemicals in your body. The kidneys keep the right balance of fluids and chemicals your body needs. · Produce hormones. They make hormones that help your body make red blood cells. Hormones also manage your blood pressure, build healthy bones, and keep your muscles working as they should. What problems can happen to your kidneys? High blood pressure and diabetes can lead to kidney problems. These include kidney infections and chronic kidney disease. Chronic kidney disease means that for some time your kidneys have not been working the way they should. Some medicines can also lead to kidney damage. Other kidney problems include kidney stones and kidney cancer. How can you prevent kidney problems? Many kidney problems come from other conditions. These include high blood pressure and diabetes. If you take steps to manage these, your kidneys can be healthier. A healthy lifestyle may help prevent kidney problems:  · Stay at a healthy weight. · Talk to your doctor about getting more exercise. · Avoid or limit alcohol and salt. · Eat plenty of fruits, vegetables, legumes, whole grains, and low-fat dairy products. · Drink plenty of fluids, enough so that your urine is light yellow or clear like water. · Lower the amount of saturated fat in your diet. · Don't smoke. Smoking can make these conditions worse. If you need help quitting, talk to your doctor about stop-smoking programs and medicines.  These can increase your chances of quitting for good. Doing these things may also lower your risk for kidney cancer. Where can you learn more? Go to http://chantal-polo.info/. Enter P995 in the search box to learn more about \"Learning About Your Kidneys. \"  Current as of: March 15, 2018  Content Version: 11.8  © 6993-1003 [employer]. Care instructions adapted under license by [employer] (which disclaims liability or warranty for this information). If you have questions about a medical condition or this instruction, always ask your healthcare professional. [place] [address] any warranty or liability for your use of this information.
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
## 2018-12-28 ENCOUNTER — OFFICE VISIT (OUTPATIENT)
Dept: ORTHOPEDIC SURGERY | Age: 63
End: 2018-12-28

## 2018-12-28 VITALS
DIASTOLIC BLOOD PRESSURE: 76 MMHG | OXYGEN SATURATION: 100 % | TEMPERATURE: 96.6 F | WEIGHT: 212 LBS | SYSTOLIC BLOOD PRESSURE: 134 MMHG | BODY MASS INDEX: 29.68 KG/M2 | RESPIRATION RATE: 16 BRPM | HEIGHT: 71 IN | HEART RATE: 61 BPM

## 2018-12-28 DIAGNOSIS — M24.549 CONTRACTURE OF HAND: Primary | ICD-10-CM
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
## 2019-03-11 ENCOUNTER — OFFICE VISIT (OUTPATIENT)
Dept: FAMILY MEDICINE CLINIC | Age: 64
End: 2019-03-11

## 2019-03-11 ENCOUNTER — HOSPITAL ENCOUNTER (OUTPATIENT)
Dept: LAB | Age: 64
Discharge: HOME | End: 2019-03-11
Payer: MEDICARE

## 2019-03-11 VITALS
TEMPERATURE: 97.8 F | DIASTOLIC BLOOD PRESSURE: 76 MMHG | OXYGEN SATURATION: 95 % | HEART RATE: 65 BPM | HEIGHT: 71 IN | WEIGHT: 220.6 LBS | RESPIRATION RATE: 18 BRPM | BODY MASS INDEX: 30.88 KG/M2 | SYSTOLIC BLOOD PRESSURE: 124 MMHG

## 2019-03-11 DIAGNOSIS — I10 ESSENTIAL HYPERTENSION: Primary | ICD-10-CM

## 2019-03-11 DIAGNOSIS — E55.9 HYPOVITAMINOSIS D: ICD-10-CM

## 2019-03-11 DIAGNOSIS — M24.542 CONTRACTURE OF LEFT HAND: ICD-10-CM

## 2019-03-11 DIAGNOSIS — I10 ESSENTIAL HYPERTENSION: ICD-10-CM

## 2019-03-11 LAB
ALBUMIN SERPL-MCNC: 4.4 G/DL (ref 3.4–5)
ALBUMIN/GLOB SERPL: 1.2 {RATIO} (ref 0.8–1.7)
ALP SERPL-CCNC: 86 U/L (ref 45–117)
ALT SERPL-CCNC: 19 U/L (ref 16–61)
ANION GAP SERPL CALC-SCNC: 6 MMOL/L (ref 3–18)
AST SERPL-CCNC: 20 U/L (ref 15–37)
BILIRUB SERPL-MCNC: 0.8 MG/DL (ref 0.2–1)
BUN SERPL-MCNC: 14 MG/DL (ref 7–18)
BUN/CREAT SERPL: 14 (ref 12–20)
CALCIUM SERPL-MCNC: 8.7 MG/DL (ref 8.5–10.1)
CHLORIDE SERPL-SCNC: 104 MMOL/L (ref 100–108)
CO2 SERPL-SCNC: 30 MMOL/L (ref 21–32)
CREAT SERPL-MCNC: 0.98 MG/DL (ref 0.6–1.3)
ERYTHROCYTE [DISTWIDTH] IN BLOOD BY AUTOMATED COUNT: 12.9 % (ref 11.6–14.5)
GLOBULIN SER CALC-MCNC: 3.7 G/DL (ref 2–4)
GLUCOSE SERPL-MCNC: 83 MG/DL (ref 74–99)
HCT VFR BLD AUTO: 42 % (ref 36–48)
HGB BLD-MCNC: 12.6 G/DL (ref 13–16)
MCH RBC QN AUTO: 30.1 PG (ref 24–34)
MCHC RBC AUTO-ENTMCNC: 30 G/DL (ref 31–37)
MCV RBC AUTO: 100.2 FL (ref 74–97)
PLATELET # BLD AUTO: 161 K/UL (ref 135–420)
PMV BLD AUTO: 13.5 FL (ref 9.2–11.8)
POTASSIUM SERPL-SCNC: 4.2 MMOL/L (ref 3.5–5.5)
PROT SERPL-MCNC: 8.1 G/DL (ref 6.4–8.2)
RBC # BLD AUTO: 4.19 M/UL (ref 4.7–5.5)
SODIUM SERPL-SCNC: 140 MMOL/L (ref 136–145)
WBC # BLD AUTO: 7.3 K/UL (ref 4.6–13.2)

## 2019-03-11 PROCEDURE — 85027 COMPLETE CBC AUTOMATED: CPT

## 2019-03-11 PROCEDURE — 82306 VITAMIN D 25 HYDROXY: CPT

## 2019-03-11 PROCEDURE — 80053 COMPREHEN METABOLIC PANEL: CPT

## 2019-03-11 RX ORDER — ERGOCALCIFEROL 1.25 MG/1
50000 CAPSULE ORAL
Qty: 13 CAP | Refills: 1 | Status: SHIPPED | OUTPATIENT
Start: 2019-03-11 | End: 2019-09-03 | Stop reason: SDUPTHER
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
## 2019-03-12 LAB — 25(OH)D3 SERPL-MCNC: 44.5 NG/ML (ref 30–100)

## 2019-03-14 NOTE — PROGRESS NOTES
Please let patient know his vitamin D level is within normal limits. His hemoglobin is slightly low at 12. 6. Normal starts at 13. I would recheck this at the next visit. Rest of his results are reassuring.   Landry Chao MD

## 2019-03-15 NOTE — PROGRESS NOTES
Spoke with patient's daughter, Tri Saleh. Verified on release of information form in chart. Verified patient's name and . Advised of previous message. She expresses understanding and will notify patient. Advised if he is able to come in fasting to next appt we can perform lab draw at that time.

## 2019-05-09 ENCOUNTER — TELEPHONE (OUTPATIENT)
Dept: FAMILY MEDICINE CLINIC | Age: 64
End: 2019-05-09

## 2019-05-09 NOTE — TELEPHONE ENCOUNTER
Patient's daughter called to check on the status of FMLA paperwork from [address] faxed on 4/30/19.

## 2019-05-28 ENCOUNTER — TELEPHONE (OUTPATIENT)
Dept: FAMILY MEDICINE CLINIC | Age: 64
End: 2019-05-28

## 2019-05-28 NOTE — TELEPHONE ENCOUNTER
Patient's daughter Lissett Norwood) called to follow up with the [facility] paper work. Stated it's been awhile and no one has called her back about this matter.  Please contact patient's daughter when available

## 2019-06-11 DIAGNOSIS — I10 ESSENTIAL HYPERTENSION: ICD-10-CM

## 2019-06-12 RX ORDER — AMLODIPINE BESYLATE 10 MG/1
TABLET ORAL
Qty: 90 TAB | Refills: 0 | Status: SHIPPED | OUTPATIENT
Start: 2019-06-12 | End: 2019-09-03 | Stop reason: SDUPTHER

## 2019-07-30 ENCOUNTER — TELEPHONE (OUTPATIENT)
Dept: FAMILY MEDICINE CLINIC | Age: 64
End: 2019-07-30

## 2019-09-03 ENCOUNTER — OFFICE VISIT (OUTPATIENT)
Dept: FAMILY MEDICINE CLINIC | Age: 64
End: 2019-09-03

## 2019-09-03 VITALS
WEIGHT: 200 LBS | TEMPERATURE: 97.5 F | HEIGHT: 71 IN | DIASTOLIC BLOOD PRESSURE: 69 MMHG | SYSTOLIC BLOOD PRESSURE: 106 MMHG | RESPIRATION RATE: 18 BRPM | OXYGEN SATURATION: 98 % | BODY MASS INDEX: 28 KG/M2 | HEART RATE: 58 BPM

## 2019-09-03 DIAGNOSIS — E55.9 HYPOVITAMINOSIS D: ICD-10-CM

## 2019-09-03 DIAGNOSIS — I10 ESSENTIAL HYPERTENSION: ICD-10-CM

## 2019-09-03 DIAGNOSIS — M54.41 CHRONIC MIDLINE LOW BACK PAIN WITH RIGHT-SIDED SCIATICA: Primary | ICD-10-CM

## 2019-09-03 DIAGNOSIS — Z23 ENCOUNTER FOR IMMUNIZATION: ICD-10-CM

## 2019-09-03 DIAGNOSIS — G89.29 CHRONIC MIDLINE LOW BACK PAIN WITH RIGHT-SIDED SCIATICA: Primary | ICD-10-CM

## 2019-09-03 RX ORDER — AMLODIPINE BESYLATE 10 MG/1
TABLET ORAL
Qty: 90 TAB | Refills: 1 | Status: SHIPPED | OUTPATIENT
Start: 2019-09-03 | End: 2019-12-03 | Stop reason: SDUPTHER

## 2019-09-03 RX ORDER — CYCLOBENZAPRINE HCL 10 MG
10 TABLET ORAL
Qty: 30 TAB | Refills: 1 | Status: SHIPPED | OUTPATIENT
Start: 2019-09-03 | End: 2019-12-03 | Stop reason: SDUPTHER

## 2019-09-03 RX ORDER — DICLOFENAC SODIUM 50 MG/1
50 TABLET, DELAYED RELEASE ORAL
Qty: 30 TAB | Refills: 1 | Status: SHIPPED | OUTPATIENT
Start: 2019-09-03 | End: 2020-12-03

## 2019-09-03 RX ORDER — ERGOCALCIFEROL 1.25 MG/1
50000 CAPSULE ORAL
Qty: 13 CAP | Refills: 1 | Status: SHIPPED | OUTPATIENT
Start: 2019-09-03 | End: 2020-12-03 | Stop reason: SDUPTHER
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
## 2019-09-03 NOTE — PROGRESS NOTES
Kelly Snell is a 58 y.o. male who presents for routine immunizations. He denies any symptoms , reactions or allergies that would exclude them from being immunized today. Risks and adverse reactions were discussed and the VIS was given to them. All questions were addressed. He was observed for 15 min post injection. There were no reactions observed.     Karena Sandhoff, LPN

## 2019-09-03 NOTE — PATIENT INSTRUCTIONS
Body Mass Index: Care Instructions  Your Care Instructions    Body mass index (BMI) can help you see if your weight is raising your risk for health problems. It uses a formula to compare how much you weigh with how tall you are. · A BMI lower than 18.5 is considered underweight. · A BMI between 18.5 and 24.9 is considered healthy. · A BMI between 25 and 29.9 is considered overweight. A BMI of 30 or higher is considered obese. If your BMI is in the normal range, it means that you have a lower risk for weight-related health problems. If your BMI is in the overweight or obese range, you may be at increased risk for weight-related health problems, such as high blood pressure, heart disease, stroke, arthritis or joint pain, and diabetes. If your BMI is in the underweight range, you may be at increased risk for health problems such as fatigue, lower protection (immunity) against illness, muscle loss, bone loss, hair loss, and hormone problems. BMI is just one measure of your risk for weight-related health problems. You may be at higher risk for health problems if you are not active, you eat an unhealthy diet, or you drink too much alcohol or use tobacco products. Follow-up care is a key part of your treatment and safety. Be sure to make and go to all appointments, and call your doctor if you are having problems. It's also a good idea to know your test results and keep a list of the medicines you take. How can you care for yourself at home? · Practice healthy eating habits. This includes eating plenty of fruits, vegetables, whole grains, lean protein, and low-fat dairy. · If your doctor recommends it, get more exercise. Walking is a good choice. Bit by bit, increase the amount you walk every day. Try for at least 30 minutes on most days of the week. · Do not smoke. Smoking can increase your risk for health problems. If you need help quitting, talk to your doctor about stop-smoking programs and medicines. These can increase your chances of quitting for good. · Limit alcohol to 2 drinks a day for men and 1 drink a day for women. Too much alcohol can cause health problems. If you have a BMI higher than 25  · Your doctor may do other tests to check your risk for weight-related health problems. This may include measuring the distance around your waist. A waist measurement of more than 40 inches in men or 35 inches in women can increase the risk of weight-related health problems. · Talk with your doctor about steps you can take to stay healthy or improve your health. You may need to make lifestyle changes to lose weight and stay healthy, such as changing your diet and getting regular exercise. If you have a BMI lower than 18.5  · Your doctor may do other tests to check your risk for health problems. · Talk with your doctor about steps you can take to stay healthy or improve your health. You may need to make lifestyle changes to gain or maintain weight and stay healthy, such as getting more healthy foods in your diet and doing exercises to build muscle. Where can you learn more? Go to http://chantal-polo.info/. Enter S176 in the search box to learn more about \"Body Mass Index: Care Instructions. \"  Current as of: October 13, 2016  Content Version: 11.4  © 4284-6859 [employer]. Care instructions adapted under license by [employer] (which disclaims liability or warranty for this information). If you have questions about a medical condition or this instruction, always ask your healthcare professional. [address] any warranty or liability for your use of this information.

## 2019-10-09 ENCOUNTER — OFFICE VISIT (OUTPATIENT)
Dept: ORTHOPEDIC SURGERY | Age: 64
End: 2019-10-09

## 2019-10-09 VITALS
RESPIRATION RATE: 16 BRPM | SYSTOLIC BLOOD PRESSURE: 120 MMHG | DIASTOLIC BLOOD PRESSURE: 80 MMHG | HEIGHT: 71 IN | TEMPERATURE: 97.8 F | BODY MASS INDEX: 29.68 KG/M2 | OXYGEN SATURATION: 99 % | HEART RATE: 68 BPM | WEIGHT: 212 LBS

## 2019-10-09 DIAGNOSIS — M54.50 LOW BACK PAIN AT MULTIPLE SITES: Primary | ICD-10-CM

## 2019-10-09 DIAGNOSIS — M54.16 LUMBAR NEURITIS: ICD-10-CM

## 2019-10-09 DIAGNOSIS — M51.36 DDD (DEGENERATIVE DISC DISEASE), LUMBAR: ICD-10-CM

## 2019-10-09 DIAGNOSIS — M47.817 LUMBOSACRAL SPONDYLOSIS WITHOUT MYELOPATHY: ICD-10-CM
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
## 2019-10-09 NOTE — LETTER
10/9/19 Patient: Mariola Marrufo YOB: 1955 Date of Visit: 10/9/2019 Corona Farias MD 
[facility] [address] [place] [address] VIA In Basket Dear Corona Farias MD, Thank you for referring Mr. Catalina Randolph to [address] [place] [address] Drive for evaluation. My notes for this consultation are attached. If you have questions, please do not hesitate to call me. I look forward to following your patient along with you. Sincerely, Shanika Crespo MD

## 2019-10-17 DIAGNOSIS — I10 ESSENTIAL HYPERTENSION: ICD-10-CM

## 2019-10-17 RX ORDER — AMLODIPINE BESYLATE 10 MG/1
TABLET ORAL
Qty: 90 TAB | Refills: 0 | Status: SHIPPED | OUTPATIENT
Start: 2019-10-17 | End: 2020-08-22

## 2019-12-03 ENCOUNTER — OFFICE VISIT (OUTPATIENT)
Dept: FAMILY MEDICINE CLINIC | Age: 64
End: 2019-12-03

## 2019-12-03 VITALS
DIASTOLIC BLOOD PRESSURE: 74 MMHG | RESPIRATION RATE: 18 BRPM | HEART RATE: 59 BPM | OXYGEN SATURATION: 95 % | WEIGHT: 221 LBS | TEMPERATURE: 98 F | BODY MASS INDEX: 30.94 KG/M2 | HEIGHT: 71 IN | SYSTOLIC BLOOD PRESSURE: 120 MMHG

## 2019-12-03 DIAGNOSIS — G89.29 CHRONIC MIDLINE LOW BACK PAIN WITH RIGHT-SIDED SCIATICA: Primary | ICD-10-CM

## 2019-12-03 DIAGNOSIS — Z00.00 ENCOUNTER FOR MEDICARE ANNUAL WELLNESS EXAM: ICD-10-CM

## 2019-12-03 DIAGNOSIS — I10 ESSENTIAL HYPERTENSION: ICD-10-CM

## 2019-12-03 DIAGNOSIS — M54.41 CHRONIC MIDLINE LOW BACK PAIN WITH RIGHT-SIDED SCIATICA: Primary | ICD-10-CM

## 2019-12-03 DIAGNOSIS — Z86.73 HISTORY OF STROKE: ICD-10-CM

## 2019-12-03 DIAGNOSIS — Z71.89 ACP (ADVANCE CARE PLANNING): ICD-10-CM

## 2019-12-03 DIAGNOSIS — R29.898 LEFT ARM WEAKNESS: ICD-10-CM

## 2019-12-03 DIAGNOSIS — M24.50 CONTRACTED, JOINT: ICD-10-CM

## 2019-12-03 DIAGNOSIS — R35.0 URINARY FREQUENCY: ICD-10-CM

## 2019-12-03 LAB
BILIRUB UR QL STRIP: NORMAL
GLUCOSE UR-MCNC: NEGATIVE MG/DL
KETONES P FAST UR STRIP-MCNC: NORMAL MG/DL
PH UR STRIP: 6 [PH] (ref 4.6–8)
PROT UR QL STRIP: NORMAL
SP GR UR STRIP: 1.02 (ref 1–1.03)
UA UROBILINOGEN AMB POC: NORMAL (ref 0.2–1)
URINALYSIS CLARITY POC: CLEAR
URINALYSIS COLOR POC: YELLOW
URINE BLOOD POC: NEGATIVE
URINE LEUKOCYTES POC: NEGATIVE
URINE NITRITES POC: NEGATIVE

## 2019-12-03 RX ORDER — CYCLOBENZAPRINE HCL 10 MG
10 TABLET ORAL
Qty: 30 TAB | Refills: 1 | Status: SHIPPED | OUTPATIENT
Start: 2019-12-03 | End: 2020-05-26
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
## 2019-12-03 NOTE — PATIENT INSTRUCTIONS
Medicare Part B Preventive Services Limitations Recommendation Scheduled Bone Mass Measurement 
(age 72 & older, biennial) Requires diagnosis related to osteoporosis or estrogen deficiency. Biennial benefit unless patient has history of long-term glucocorticoid tx or baseline is needed because initial test was by other method n/a Cardiovascular Screening Blood Tests (every 5 years) Total cholesterol, HDL, Triglycerides and ECG Order blood work  as a panel if possible and  for adults with routine risk  an electrocardiogram (ECG) at intervals determined by the provider. Due    
Colorectal Cancer Screening 
-Fecal occult blood test (annual) -Flexible sigmoidoscopy (5y) 
-Screening colonoscopy (10y) -Barium Enema Colorectal cancer screening should be done for adults age 54-65 with no increased risk factors for colorectal cancer. There are a number of acceptable methods of screening for this type of cancer. Each test has its own benefits and drawbacks. Discuss with your provider what is most appropriate for you during your annual wellness visit. The different tests include: colonoscopy (considered the best screening method), a fecal occult blood test, a fecal DNA test, and sigmoidoscopy Due    
Counseling to Prevent Tobacco Use (up to 8 sessions per year) - Counseling greater than 3 and up to 10 minutes - Counseling greater than 10 minutes Patients must be asymptomatic of tobacco-related conditions to receive as preventive service N/a Diabetes Screening Tests (at least every 3 years, Medicare covers annually or at 6-month intervals for prediabetic patients) Fasting blood sugar (FBS) or glucose tolerance test (GTT) All adults age 38-68 who are overweight should have a diabetes screening test once every three years. -Other screening tests & preventive services for persons with diabetes include: an eye exam to screen for diabetic retinopathy, a kidney function test, a foot exam, and stricter control over your cholesterol. n/a Diabetes Self-Management Training (DSMT) (no USPSTF recommendation) Requires referral by treating physician for patient with diabetes or renal disease. 10 hours of initial DSMT session of no less than 30 minutes each in a continuous 12-month period. 2 hours of follow-up DSMT in subsequent years. N/a Glaucoma Screening (no USPSTF recommendation) Diabetes mellitus, family history, , age 48 or over,  American, age 72 or over N/a Human Immunodeficiency Virus (HIV) Screening (annually for increased risk patients) HIV-1 and HIV-2 by EIA, DONOVAN, rapid antibody test, or oral mucosa transudate Patient must be at increased risk for HIV infection per USPSTF guidelines or pregnant. Tests covered annually for patients at increased risk. Pregnant patients may receive up to 3 test during pregnancy. n/a Medical Nutrition Therapy (MNT) (for diabetes or renal disease not recommended schedule) Requires referral by treating physician for patient with diabetes or renal disease. Can be provided in same year as diabetes self-management training (DSMT), and CMS recommends medical nutrition therapy take place after DSMT. Up to 3 hours for initial year and 2 hours in subsequent years. n/a Prostate Cancer Screening (annually up to age 76) - Digital rectal exam (MAYRA) - Prostate specific antigen (PSA) Annually (age 48 or over), MAYRA not paid separately when covered E/M service is provided on same date Men up to age 76 may need a screening blood test for prostate cancer at certain intervals, depending on their personal and family history. This decision is between the patient and his provider. Done in 12/2018 Seasonal Influenza Vaccination (annually) All adults should have a flu vaccine yearly  utd Pneumococcal Vaccination (once after 72) All adults  over age 72 should receive the recommended pneumonia vaccines. Current USPSTF guidelines recommend a series of two vaccines for the best pneumonia protection. n/a Hepatitis B Vaccinations (if medium/high risk) Medium/high risk factors:  End-stage renal disease, Hemophiliacs who received Factor VIII or IX concentrates, Clients of institutions for the mentally retarded, Persons who live in the same house as a HepB virus carrier, Homosexual men, Illicit injectable drug abusers. N/a Shingles Vaccination A shingles vaccine is also recommended once in a lifetime after age 61 Due Ultrasound Screening for Abdominal Aortic Aneurysm (AAA) (once) An Abdominal Aortic Aneurysm (AAA) Screening is recommended for men age 73-68 who has ever smoked in their lifetime. of the following criteria: 
- Men who are 73-68 years old and have smoked more than 100 cigarettes in their lifetime. 
-Anyone with a FH of AAA 
-Anyone recommended for screening by USPSTF N/a Hep C All adults born between 80 and 1965 should be screened once for Hepatitis C utd Tetanus  All adults should have a tetanus vaccine every 10 years Due
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
## 2019-12-04 NOTE — ACP (ADVANCE CARE PLANNING)
Advance Care Planning (ACP) Provider Conversation Snapshot    Date of ACP Conversation: 12/03/19  Persons included in Conversation:  patient and family  Length of ACP Conversation in minutes:  <16 minutes (Non-Billable)    Authorized Decision Maker (if patient is incapable of making informed decisions): This person is:   Daughter. They will fill out forms given and bring these back for scanning into the EMR chart. For Patients with Decision Making Capacity:   Values/Goals: Exploration of values, goals, and preferences if recovery is not expected, even with continued medical treatment in the event of:  Imminent death  Severe, permanent brain injury  \"In these circumstances, what matters most to you? \"  Care focused more on comfort or quality of life.     Conversation Outcomes / Follow-Up Plan:   Recommended completion of Advance Directive form after review of ACP materials and conversation with prospective healthcare agent      Natalie Tanner MD

## 2020-05-26 DIAGNOSIS — M54.41 CHRONIC MIDLINE LOW BACK PAIN WITH RIGHT-SIDED SCIATICA: ICD-10-CM

## 2020-05-26 DIAGNOSIS — G89.29 CHRONIC MIDLINE LOW BACK PAIN WITH RIGHT-SIDED SCIATICA: ICD-10-CM

## 2020-05-26 RX ORDER — CYCLOBENZAPRINE HCL 10 MG
TABLET ORAL
Qty: 30 TAB | Refills: 1 | Status: SHIPPED | OUTPATIENT
Start: 2020-05-26 | End: 2020-12-03 | Stop reason: SDUPTHER

## 2020-06-03 ENCOUNTER — VIRTUAL VISIT (OUTPATIENT)
Dept: FAMILY MEDICINE CLINIC | Age: 65
End: 2020-06-03

## 2020-06-03 DIAGNOSIS — Z86.73 HISTORY OF STROKE: ICD-10-CM

## 2020-06-03 DIAGNOSIS — R35.0 URINARY FREQUENCY: ICD-10-CM

## 2020-06-03 DIAGNOSIS — I10 ESSENTIAL HYPERTENSION: Primary | ICD-10-CM

## 2020-06-03 DIAGNOSIS — M24.542 CONTRACTURE OF LEFT HAND: ICD-10-CM

## 2020-06-03 NOTE — PROGRESS NOTES
Gina Aburto is a 71 y.o. male evaluated via audio only technology on 6/3/2020. Consent: He and/or his health care decision maker is aware that he may receive a bill for this audio only encounter, depending on his insurance coverage, and has provided verbal consent to proceed: Yes    I communicated with the patient and/or health care decision maker about the nature and details of the following:  Assessment & Plan:       ICD-10-CM ICD-9-CM    1. Essential hypertension I10 401.9    2. Urinary frequency R35.0 788.41    3. History of stroke Z86.73 V12.54    4. Contracture of left hand M24.542 718.44          12  Subjective:   Gina Aburto is a 71 y.o. male who was seen for Hypertension  Patient has hypertension. He is on amlodipine 10 mg daily. Blood pressure has been stable. We will have him continue with this medication. He also has hypovitaminosis D and takes replacement therapy 50,000 units weekly. He does have his medication. He will continue with the same. Patient has a history of CVA with left-sided weakness. Does get muscle spasms in his lower back on and off. Still has some slight weakness on the left side but gets around using a cane. He takes Flexeril for this. I will send in a refill of medication. Patient does have frequent urination. No dysuria or hematuria. We do need to do a urinalysis. He would prefer to hold off until his next visit. Patient has colon cancer screening scheduled for later this year. Prior to Admission medications    Medication Sig Start Date End Date Taking? Authorizing Provider   cyclobenzaprine (FLEXERIL) 10 mg tablet TAKE 1 TABLET BY MOUTH THREE TIMES DAILY AS NEEDED FOR MUSCLE SPASMS 5/26/20   Natalie Hawkins MD   amLODIPine (NORVASC) 10 mg tablet TAKE 1 TABLET BY MOUTH EVERY NIGHT 10/17/19   Natalie Hawkins MD   ergocalciferol (ERGOCALCIFEROL) 50,000 unit capsule Take 1 Cap by mouth every seven (7) days.  9/3/19   Natalie Hawkins MD   diclofenac EC (VOLTAREN) 50 mg EC tablet Take 1 Tab by mouth two (2) times daily as needed for Pain. 9/3/19   Natalie Hawkins MD   acetaminophen (TYLENOL) 500 mg tablet Take  by mouth every six (6) hours as needed for Pain. Provider, Historical     No Known Allergies    ROS Review of all systems is negative except as noted above in the HPI. I affirm this is a Patient-Initiated Episode with a Patient who has not had a related appointment within my department in the past 7 days or scheduled within the next 24 hours.     Total Time: minutes: 11-20 minutes    Note: not billable if this call serves to triage the patient into an appointment for the relevant concern      Karen Zelaya MD

## 2020-08-22 DIAGNOSIS — I10 ESSENTIAL HYPERTENSION: ICD-10-CM

## 2020-08-22 RX ORDER — AMLODIPINE BESYLATE 10 MG/1
TABLET ORAL
Qty: 90 TAB | Refills: 0 | Status: SHIPPED | OUTPATIENT
Start: 2020-08-22 | End: 2020-11-20 | Stop reason: SDUPTHER

## 2020-10-09 ENCOUNTER — TELEPHONE (OUTPATIENT)
Dept: FAMILY MEDICINE CLINIC | Age: 65
End: 2020-10-09

## 2020-10-11 DIAGNOSIS — Z12.11 SCREEN FOR COLON CANCER: Primary | ICD-10-CM

## 2020-11-20 DIAGNOSIS — I10 ESSENTIAL HYPERTENSION: ICD-10-CM

## 2020-11-23 NOTE — TELEPHONE ENCOUNTER
Requested Prescriptions     Pending Prescriptions Disp Refills    amLODIPine (NORVASC) 10 mg tablet 90 Tab 0     Patient requesting the following medication refill       Date last prescribed: 08/22/20    Date patient last seen: 06/03/20 virtual appointment     Follow up appointment scheduled: 12/03/20    Please Advise

## 2020-11-24 RX ORDER — AMLODIPINE BESYLATE 10 MG/1
TABLET ORAL
Qty: 90 TAB | Refills: 0 | Status: SHIPPED | OUTPATIENT
Start: 2020-11-24 | End: 2021-02-26

## 2020-12-03 ENCOUNTER — VIRTUAL VISIT (OUTPATIENT)
Dept: FAMILY MEDICINE CLINIC | Age: 65
End: 2020-12-03
Payer: MEDICARE

## 2020-12-03 DIAGNOSIS — M24.542 CONTRACTURE OF LEFT HAND: ICD-10-CM

## 2020-12-03 DIAGNOSIS — I10 ESSENTIAL HYPERTENSION: Primary | ICD-10-CM

## 2020-12-03 DIAGNOSIS — Z00.00 MEDICARE ANNUAL WELLNESS VISIT, SUBSEQUENT: ICD-10-CM

## 2020-12-03 DIAGNOSIS — E55.9 HYPOVITAMINOSIS D: ICD-10-CM

## 2020-12-03 DIAGNOSIS — Z86.73 HISTORY OF STROKE: ICD-10-CM

## 2020-12-03 DIAGNOSIS — N40.1 BENIGN LOCALIZED PROSTATIC HYPERPLASIA WITH LOWER URINARY TRACT SYMPTOMS (LUTS): ICD-10-CM

## 2020-12-03 DIAGNOSIS — M54.41 CHRONIC MIDLINE LOW BACK PAIN WITH RIGHT-SIDED SCIATICA: ICD-10-CM

## 2020-12-03 DIAGNOSIS — Z71.89 ADVANCED DIRECTIVES, COUNSELING/DISCUSSION: ICD-10-CM

## 2020-12-03 DIAGNOSIS — Z13.31 SCREENING FOR DEPRESSION: ICD-10-CM

## 2020-12-03 DIAGNOSIS — G89.29 CHRONIC MIDLINE LOW BACK PAIN WITH RIGHT-SIDED SCIATICA: ICD-10-CM

## 2020-12-03 DIAGNOSIS — Z12.5 SCREENING FOR MALIGNANT NEOPLASM OF PROSTATE: ICD-10-CM

## 2020-12-03 PROCEDURE — G8427 DOCREV CUR MEDS BY ELIG CLIN: HCPCS | Performed by: FAMILY MEDICINE

## 2020-12-03 PROCEDURE — G8756 NO BP MEASURE DOC: HCPCS | Performed by: FAMILY MEDICINE

## 2020-12-03 PROCEDURE — G0439 PPPS, SUBSEQ VISIT: HCPCS | Performed by: FAMILY MEDICINE

## 2020-12-03 PROCEDURE — G8417 CALC BMI ABV UP PARAM F/U: HCPCS | Performed by: FAMILY MEDICINE

## 2020-12-03 PROCEDURE — 99497 ADVNCD CARE PLAN 30 MIN: CPT | Performed by: FAMILY MEDICINE

## 2020-12-03 PROCEDURE — G8536 NO DOC ELDER MAL SCRN: HCPCS | Performed by: FAMILY MEDICINE

## 2020-12-03 PROCEDURE — 1101F PT FALLS ASSESS-DOCD LE1/YR: CPT | Performed by: FAMILY MEDICINE

## 2020-12-03 PROCEDURE — G8510 SCR DEP NEG, NO PLAN REQD: HCPCS | Performed by: FAMILY MEDICINE

## 2020-12-03 PROCEDURE — 99443 PR PHYS/QHP TELEPHONE EVALUATION 21-30 MIN: CPT | Performed by: FAMILY MEDICINE

## 2020-12-03 PROCEDURE — G0444 DEPRESSION SCREEN ANNUAL: HCPCS | Performed by: FAMILY MEDICINE

## 2020-12-03 PROCEDURE — 3017F COLORECTAL CA SCREEN DOC REV: CPT | Performed by: FAMILY MEDICINE

## 2020-12-03 RX ORDER — ATORVASTATIN CALCIUM 20 MG/1
20 TABLET, FILM COATED ORAL DAILY
Qty: 30 TAB | Refills: 2 | Status: SHIPPED | OUTPATIENT
Start: 2020-12-03 | End: 2021-03-15 | Stop reason: SDUPTHER

## 2020-12-03 RX ORDER — ERGOCALCIFEROL 1.25 MG/1
50000 CAPSULE ORAL
Qty: 13 CAP | Refills: 3 | Status: SHIPPED | OUTPATIENT
Start: 2020-12-03

## 2020-12-03 RX ORDER — CYCLOBENZAPRINE HCL 10 MG
TABLET ORAL
Qty: 30 TAB | Refills: 2 | Status: SHIPPED | OUTPATIENT
Start: 2020-12-03 | End: 2021-05-06 | Stop reason: SDUPTHER

## 2020-12-03 RX ORDER — ASPIRIN 81 MG/1
81 TABLET ORAL DAILY
Qty: 90 TAB | Refills: 3 | Status: SHIPPED | OUTPATIENT
Start: 2020-12-03

## 2020-12-03 NOTE — ACP (ADVANCE CARE PLANNING)
Advance Care Planning     Advance Care Planning (ACP) Physician/NP/PA Conversation      Date of Conversation: 12/3/2020  Conducted with: Patient with Decision Making Capacity    Healthcare Decision Maker:     Click here to complete [address] including selection of the Healthcare Decision Maker Relationship (ie \"Primary\")  Today we documented Decision Maker(s). The patient will provide ACP documents. Patient states that his daughter Lesley Abdi phone #[phone] would be his authorized decision maker. Care Preferences:    Hospitalization: \"If your health worsens and it becomes clear that your chance of recovery is unlikely, what would be your preference regarding hospitalization? \"  The patient would prefer hospitalization. Ventilation: \"If you were unable to breathe on your own and your chance of recovery was unlikely, what would be your preference about the use of a ventilator (breathing machine) if it was available to you? \"   The patient would NOT desire the use of a ventilator. Resuscitation: \"In the event your heart stopped as a result of an underlying serious health condition, would you want attempts to be made to restart your heart, or would you prefer a natural death? \"   The patient is unsure. Additional topics discussed: treatment goals, ventilation preferences, hospitalization preferences and resuscitation preferences    Conversation Outcomes / Follow-Up Plan:   ACP in process - information provided, considering goals and options  Reviewed DNR/DNI and patient currently not sure. Feels that he does not want anything done but he will want to talk to his daughter and he will fill out forms about his decision.     Length of Voluntary ACP Conversation in minutes:  16 minutes    Natalie Malcolm MD

## 2020-12-03 NOTE — PROGRESS NOTES
Edie Fish is a 71 y.o. male, evaluated via audio-only technology on 12/3/2020 for Hypertension; Extremity Weakness; Cerebrovascular Accident; and Annual Wellness Visit  . Assessment & Plan:       ICD-10-CM ICD-9-CM    1. Essential hypertension  S61 703.0 METABOLIC PANEL, COMPREHENSIVE      CBC WITH AUTOMATED DIFF      LIPID PANEL   2. Contracture of left hand  M24.542 718.44 REFERRAL TO ORTHOPEDICS   3. History of stroke  Z86.73 V12.54 aspirin delayed-release 81 mg tablet   4. Hypovitaminosis D  E55.9 268.9 VITAMIN D, 25 HYDROXY      ergocalciferol (ERGOCALCIFEROL) 1,250 mcg (50,000 unit) capsule   5. Benign localized prostatic hyperplasia with lower urinary tract symptoms (LUTS)  N40.1 600.21      599.69    6. Chronic midline low back pain with right-sided sciatica  M54.41 724.2 cyclobenzaprine (FLEXERIL) 10 mg tablet    G89.29 724.3      338.29    7. Medicare annual wellness visit, subsequent  Z00.00 V70.0    8. Screening for malignant neoplasm of prostate  Z12.5 V76.44 PSA SCREENING (SCREENING)   9. Advanced directives, counseling/discussion  Z71.89 V65.49 ADVANCE CARE PLANNING FIRST 30 MINS   10. Screening for depression  Z13.31 V79.0 DEPRESSION SCREEN ANNUAL      NE DEPRESSION SCREEN ANNUAL     12  Subjective:   Edie Fish had a phone visit today for follow-up care. Hypertension: Patient has hypertension. Blood pressure has been stable. He takes amlodipine. He denies headache, changes in vision or focal weakness. He will continue with the current treatment plan. Contracture left hand: Patient has contracture of the left hand. We had seen this in the past and referred him to occupational therapy to fashion a brace as the fingernails keep digging into the palm of his hand. States that he had been seen and not much relief has been offered. After discussion he would like to see the hand specialist.  I will place a referral for this.   CVA: Patient has a history of CVA with resultant left-sided weakness. He currently gets around using a cane. He is able to do his on grooming and take care of his bathroom needs. However needs some help with the cooking and transportation. Left-sided weakness resulted in the contracture of his left hand. Patient is to be on aspirin daily. I will send in a prescription for this. He is on blood pressure medication. We will recheck his lipid panel. Will likely need to be on a statin medication. Hypovitaminosis D: Patient has a history of hypovitaminosis D. We will recheck labs. I will send in a prescription for vitamin D replacement therapy. BPH with LUTS: Patient has BPH with lower urinary tract symptoms. He gets urinary incontinence. He has been seen by the urologist.  He did have a follow-up appointment yesterday as per the records which he did not attend. I did advise that he needs to ensure he sees his urologist.  I see another appointment scheduled at the end of this month and I did let him know of this. He will call to confirm his appointment. In the meantime urinary incontinence is noted even without increased intra-abdominal pressure. He can wear depends and get this changed frequently. Chronic low back pain: Patient has chronic low back pain. He takes Flexeril as a muscle relaxant for the muscle spasms. He also has Tylenol for pain as needed. He does need a refill of the Flexeril. Prescription will be sent in. He occasionally gets sciatica on his right lower extremity. Does have difficulty with mobility and gets around using a cane. Prior to Admission medications    Medication Sig Start Date End Date Taking? Authorizing Provider   cyclobenzaprine (FLEXERIL) 10 mg tablet TAKE 1 TABLET BY MOUTH THREE TIMES DAILY AS NEEDED FOR MUSCLE SPASMS 12/3/20  Yes Tosin Arguello MD   ergocalciferol (ERGOCALCIFEROL) 1,250 mcg (50,000 unit) capsule Take 1 Cap by mouth every seven (7) days.  12/3/20  Yes Tosin Arguello MD   amLODIPine (NORVASC) 10 mg tablet TAKE 1 TABLET BY MOUTH EVERY NIGHT 11/24/20   Jennifer Webb, NP   cyclobenzaprine (FLEXERIL) 10 mg tablet TAKE 1 TABLET BY MOUTH THREE TIMES DAILY AS NEEDED FOR MUSCLE SPASMS 5/26/20 12/3/20  Natalie Hawkins MD   ergocalciferol (ERGOCALCIFEROL) 50,000 unit capsule Take 1 Cap by mouth every seven (7) days. 9/3/19 12/3/20  Natalie Hawkins MD   diclofenac EC (VOLTAREN) 50 mg EC tablet Take 1 Tab by mouth two (2) times daily as needed for Pain. 9/3/19 12/3/20  Natalie Hawkins MD   acetaminophen (TYLENOL) 500 mg tablet Take  by mouth every six (6) hours as needed for Pain. Provider, Historical     ROS Review of all systems is negative except as noted above in the HPI. No flowsheet data found. Sharon Rai, who was evaluated through a patient-initiated, synchronous (real-time) audio only encounter, and/or her healthcare decision maker, is aware that it is a billable service, with coverage as determined by his insurance carrier. He provided verbal consent to proceed: Yes. He has not had a related appointment within my department in the past 7 days or scheduled within the next 24 hours.       Total Time: minutes: 21-30 minutes    Natalie Fritz MD

## 2020-12-03 NOTE — PATIENT INSTRUCTIONS
Medicare Wellness Visit, Male    The best way to live healthy is to have a lifestyle where you eat a well-balanced diet, exercise regularly, limit alcohol use, and quit all forms of tobacco/nicotine, if applicable. Regular preventive services are another way to keep healthy. Preventive services (vaccines, screening tests, monitoring & exams) can help personalize your care plan, which helps you manage your own care. Screening tests can find health problems at the earliest stages, when they are easiest to treat. [facility] follows the current, evidence-based guidelines published by the [facility] ([facility]) when recommending preventive services for our patients. Because we follow these guidelines, sometimes recommendations change over time as research supports it. (For example, a prostate screening blood test is no longer routinely recommended for men with no symptoms). Of course, you and your doctor may decide to screen more often for some diseases, based on your risk and co-morbidities (chronic disease you are already diagnosed with). Preventive services for you include:  - Medicare offers their members a free annual wellness visit, which is time for you and your primary care provider to discuss and plan for your preventive service needs. Take advantage of this benefit every year!  -All adults over age 72 should receive the recommended pneumonia vaccines. Current USPSTF guidelines recommend a series of two vaccines for the best pneumonia protection.   -All adults should have a flu vaccine yearly and tetanus vaccine every 10 years.  -All adults age 48 and older should receive the shingles vaccines (series of two vaccines).        -All adults age 38-68 who are overweight should have a diabetes screening test once every three years.   -Other screening tests & preventive services for persons with diabetes include: an eye exam to screen for diabetic retinopathy, a kidney function test, a foot exam, and stricter control over your cholesterol.   -Cardiovascular screening for adults with routine risk involves an electrocardiogram (ECG) at intervals determined by the provider.   -Colorectal cancer screening should be done for adults age 54-65 with no increased risk factors for colorectal cancer. There are a number of acceptable methods of screening for this type of cancer. Each test has its own benefits and drawbacks. Discuss with your provider what is most appropriate for you during your annual wellness visit. The different tests include: colonoscopy (considered the best screening method), a fecal occult blood test, a fecal DNA test, and sigmoidoscopy.  -All adults born between [facility] should be screened once for Hepatitis C.  -An Abdominal Aortic Aneurysm (AAA) Screening is recommended for men age 73-68 who has ever smoked in their lifetime.      Here is a list of your current Health Maintenance items (your personalized list of preventive services) with a due date:  Health Maintenance Due   Topic Date Due    DTaP/Tdap/Td  (1 - Tdap) 12/21/1975    Pneumococcal Vaccine (1 of 1 - PPSV23) 12/21/2019    Annual Well Visit  12/03/2020
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
## 2020-12-10 ENCOUNTER — TELEPHONE (OUTPATIENT)
Dept: FAMILY MEDICINE CLINIC | Age: 65
End: 2020-12-10

## 2020-12-10 NOTE — TELEPHONE ENCOUNTER
Toshia Lloyd is calling regarding [facility] paperwork, stated she has been waiting to hear back and no one has called her. Ms Kaela Blake stated she really needs her paperwork completed.  Please contact when available

## 2020-12-18 LAB
CREATININE, EXTERNAL: 0.8
LDL-C, EXTERNAL: 49
PSA, EXTERNAL: 1.33

## 2021-02-09 ENCOUNTER — HOME HEALTH ADMISSION (OUTPATIENT)
Dept: HOME HEALTH SERVICES | Facility: HOME HEALTH | Age: 66
End: 2021-02-09
Payer: MEDICARE

## 2021-02-09 ENCOUNTER — OFFICE VISIT (OUTPATIENT)
Dept: FAMILY MEDICINE CLINIC | Age: 66
End: 2021-02-09
Payer: MEDICARE

## 2021-02-09 VITALS
WEIGHT: 214 LBS | SYSTOLIC BLOOD PRESSURE: 137 MMHG | DIASTOLIC BLOOD PRESSURE: 88 MMHG | HEIGHT: 71 IN | OXYGEN SATURATION: 100 % | RESPIRATION RATE: 16 BRPM | BODY MASS INDEX: 29.96 KG/M2 | HEART RATE: 67 BPM | TEMPERATURE: 98.5 F

## 2021-02-09 DIAGNOSIS — B18.2 CHRONIC HEPATITIS C WITHOUT HEPATIC COMA (HCC): ICD-10-CM

## 2021-02-09 DIAGNOSIS — E55.9 HYPOVITAMINOSIS D: ICD-10-CM

## 2021-02-09 DIAGNOSIS — I82.491 DEEP VEIN THROMBOSIS (DVT) OF OTHER VEIN OF RIGHT LOWER EXTREMITY, UNSPECIFIED CHRONICITY (HCC): Primary | ICD-10-CM

## 2021-02-09 DIAGNOSIS — N40.1 BENIGN LOCALIZED PROSTATIC HYPERPLASIA WITH LOWER URINARY TRACT SYMPTOMS (LUTS): ICD-10-CM

## 2021-02-09 DIAGNOSIS — Z12.11 SCREEN FOR COLON CANCER: ICD-10-CM

## 2021-02-09 DIAGNOSIS — Z23 ENCOUNTER FOR IMMUNIZATION: ICD-10-CM

## 2021-02-09 DIAGNOSIS — Z86.73 HISTORY OF CVA (CEREBROVASCULAR ACCIDENT): ICD-10-CM

## 2021-02-09 DIAGNOSIS — G81.94 LEFT HEMIPARESIS (HCC): ICD-10-CM

## 2021-02-09 DIAGNOSIS — I10 ESSENTIAL HYPERTENSION: ICD-10-CM

## 2021-02-09 PROCEDURE — G8417 CALC BMI ABV UP PARAM F/U: HCPCS | Performed by: FAMILY MEDICINE

## 2021-02-09 PROCEDURE — G8427 DOCREV CUR MEDS BY ELIG CLIN: HCPCS | Performed by: FAMILY MEDICINE

## 2021-02-09 PROCEDURE — 3017F COLORECTAL CA SCREEN DOC REV: CPT | Performed by: FAMILY MEDICINE

## 2021-02-09 PROCEDURE — 1101F PT FALLS ASSESS-DOCD LE1/YR: CPT | Performed by: FAMILY MEDICINE

## 2021-02-09 PROCEDURE — 99214 OFFICE O/P EST MOD 30 MIN: CPT | Performed by: FAMILY MEDICINE

## 2021-02-09 PROCEDURE — 90732 PPSV23 VACC 2 YRS+ SUBQ/IM: CPT | Performed by: FAMILY MEDICINE

## 2021-02-09 PROCEDURE — G0009 ADMIN PNEUMOCOCCAL VACCINE: HCPCS | Performed by: FAMILY MEDICINE

## 2021-02-09 PROCEDURE — G8752 SYS BP LESS 140: HCPCS | Performed by: FAMILY MEDICINE

## 2021-02-09 PROCEDURE — G8754 DIAS BP LESS 90: HCPCS | Performed by: FAMILY MEDICINE

## 2021-02-09 PROCEDURE — G8510 SCR DEP NEG, NO PLAN REQD: HCPCS | Performed by: FAMILY MEDICINE

## 2021-02-09 PROCEDURE — G8536 NO DOC ELDER MAL SCRN: HCPCS | Performed by: FAMILY MEDICINE
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
## 2021-02-09 NOTE — PROGRESS NOTES
Chief Complaint   Patient presents with    Follow Up Chronic Condition     Blood clot in leg     1. Have you been to the ER, urgent care clinic since your last visit? Hospitalized since your last visit? Yes When: 2/5/2021 Where: [facility] Reason for visit: blood clot in leg    2. Have you seen or consulted any other health care providers outside of the [address] [place] [address] since your last visit? Include any pap smears or colon screening.  No

## 2021-02-11 ENCOUNTER — HOME CARE VISIT (OUTPATIENT)
Dept: HOME HEALTH SERVICES | Facility: HOME HEALTH | Age: 66
End: 2021-02-11

## 2021-02-11 DIAGNOSIS — E55.9 HYPOVITAMINOSIS D: ICD-10-CM

## 2021-02-11 DIAGNOSIS — I10 ESSENTIAL HYPERTENSION: ICD-10-CM

## 2021-02-11 DIAGNOSIS — Z12.5 SCREENING FOR MALIGNANT NEOPLASM OF PROSTATE: ICD-10-CM

## 2021-02-12 ENCOUNTER — HOME CARE VISIT (OUTPATIENT)
Dept: SCHEDULING | Facility: HOME HEALTH | Age: 66
End: 2021-02-12
Payer: MEDICARE

## 2021-02-12 PROCEDURE — G0151 HHCP-SERV OF PT,EA 15 MIN: HCPCS

## 2021-02-12 PROCEDURE — 400013 HH SOC

## 2021-02-12 PROCEDURE — 3331090002 HH PPS REVENUE DEBIT

## 2021-02-12 PROCEDURE — 400018 HH-NO PAY CLAIM PROCEDURE

## 2021-02-12 PROCEDURE — 3331090001 HH PPS REVENUE CREDIT

## 2021-02-13 VITALS
WEIGHT: 214 LBS | DIASTOLIC BLOOD PRESSURE: 82 MMHG | TEMPERATURE: 97.5 F | SYSTOLIC BLOOD PRESSURE: 148 MMHG | OXYGEN SATURATION: 98 % | BODY MASS INDEX: 29.96 KG/M2 | RESPIRATION RATE: 18 BRPM | HEART RATE: 64 BPM | HEIGHT: 71 IN

## 2021-02-13 PROCEDURE — 3331090001 HH PPS REVENUE CREDIT

## 2021-02-13 PROCEDURE — 3331090002 HH PPS REVENUE DEBIT

## 2021-02-14 PROCEDURE — 3331090002 HH PPS REVENUE DEBIT

## 2021-02-14 PROCEDURE — 3331090001 HH PPS REVENUE CREDIT

## 2021-02-15 ENCOUNTER — HOME CARE VISIT (OUTPATIENT)
Dept: SCHEDULING | Facility: HOME HEALTH | Age: 66
End: 2021-02-15
Payer: MEDICARE

## 2021-02-15 PROCEDURE — G0157 HHC PT ASSISTANT EA 15: HCPCS

## 2021-02-15 PROCEDURE — 3331090002 HH PPS REVENUE DEBIT

## 2021-02-15 PROCEDURE — 3331090001 HH PPS REVENUE CREDIT

## 2021-02-16 ENCOUNTER — HOME CARE VISIT (OUTPATIENT)
Dept: HOME HEALTH SERVICES | Facility: HOME HEALTH | Age: 66
End: 2021-02-16
Payer: MEDICARE

## 2021-02-16 ENCOUNTER — HOME CARE VISIT (OUTPATIENT)
Dept: SCHEDULING | Facility: HOME HEALTH | Age: 66
End: 2021-02-16
Payer: MEDICARE

## 2021-02-16 VITALS
HEART RATE: 74 BPM | SYSTOLIC BLOOD PRESSURE: 130 MMHG | DIASTOLIC BLOOD PRESSURE: 78 MMHG | OXYGEN SATURATION: 99 % | TEMPERATURE: 98.6 F

## 2021-02-16 VITALS
DIASTOLIC BLOOD PRESSURE: 78 MMHG | HEART RATE: 71 BPM | SYSTOLIC BLOOD PRESSURE: 132 MMHG | TEMPERATURE: 96.9 F | OXYGEN SATURATION: 100 %

## 2021-02-16 PROCEDURE — G0152 HHCP-SERV OF OT,EA 15 MIN: HCPCS

## 2021-02-16 PROCEDURE — 3331090002 HH PPS REVENUE DEBIT

## 2021-02-16 PROCEDURE — 3331090001 HH PPS REVENUE CREDIT

## 2021-02-17 ENCOUNTER — HOME CARE VISIT (OUTPATIENT)
Dept: SCHEDULING | Facility: HOME HEALTH | Age: 66
End: 2021-02-17
Payer: MEDICARE

## 2021-02-17 VITALS
DIASTOLIC BLOOD PRESSURE: 88 MMHG | HEART RATE: 68 BPM | SYSTOLIC BLOOD PRESSURE: 145 MMHG | OXYGEN SATURATION: 100 % | TEMPERATURE: 97.8 F

## 2021-02-17 PROCEDURE — 3331090001 HH PPS REVENUE CREDIT

## 2021-02-17 PROCEDURE — G0157 HHC PT ASSISTANT EA 15: HCPCS

## 2021-02-17 PROCEDURE — 3331090002 HH PPS REVENUE DEBIT

## 2021-02-18 PROCEDURE — 3331090001 HH PPS REVENUE CREDIT

## 2021-02-18 PROCEDURE — 3331090002 HH PPS REVENUE DEBIT

## 2021-02-19 ENCOUNTER — HOME CARE VISIT (OUTPATIENT)
Dept: SCHEDULING | Facility: HOME HEALTH | Age: 66
End: 2021-02-19
Payer: MEDICARE

## 2021-02-19 PROCEDURE — 3331090001 HH PPS REVENUE CREDIT

## 2021-02-19 PROCEDURE — 3331090002 HH PPS REVENUE DEBIT

## 2021-02-19 PROCEDURE — G0158 HHC OT ASSISTANT EA 15: HCPCS

## 2021-02-20 VITALS
RESPIRATION RATE: 20 BRPM | SYSTOLIC BLOOD PRESSURE: 142 MMHG | DIASTOLIC BLOOD PRESSURE: 78 MMHG | HEART RATE: 60 BPM | TEMPERATURE: 98.9 F | OXYGEN SATURATION: 96 %

## 2021-02-20 PROCEDURE — 3331090002 HH PPS REVENUE DEBIT

## 2021-02-20 PROCEDURE — 3331090001 HH PPS REVENUE CREDIT

## 2021-02-21 PROCEDURE — 3331090002 HH PPS REVENUE DEBIT

## 2021-02-21 PROCEDURE — 3331090001 HH PPS REVENUE CREDIT

## 2021-02-22 ENCOUNTER — HOME CARE VISIT (OUTPATIENT)
Dept: SCHEDULING | Facility: HOME HEALTH | Age: 66
End: 2021-02-22
Payer: MEDICARE

## 2021-02-22 PROCEDURE — 3331090002 HH PPS REVENUE DEBIT

## 2021-02-22 PROCEDURE — G0157 HHC PT ASSISTANT EA 15: HCPCS

## 2021-02-22 PROCEDURE — 3331090001 HH PPS REVENUE CREDIT

## 2021-02-23 ENCOUNTER — HOME CARE VISIT (OUTPATIENT)
Dept: SCHEDULING | Facility: HOME HEALTH | Age: 66
End: 2021-02-23
Payer: MEDICARE

## 2021-02-23 VITALS
SYSTOLIC BLOOD PRESSURE: 128 MMHG | DIASTOLIC BLOOD PRESSURE: 82 MMHG | OXYGEN SATURATION: 98 % | HEART RATE: 65 BPM | TEMPERATURE: 97.9 F

## 2021-02-23 VITALS
RESPIRATION RATE: 18 BRPM | DIASTOLIC BLOOD PRESSURE: 90 MMHG | OXYGEN SATURATION: 96 % | SYSTOLIC BLOOD PRESSURE: 140 MMHG | HEART RATE: 63 BPM | TEMPERATURE: 98 F

## 2021-02-23 PROCEDURE — G0158 HHC OT ASSISTANT EA 15: HCPCS

## 2021-02-23 PROCEDURE — 3331090002 HH PPS REVENUE DEBIT

## 2021-02-23 PROCEDURE — 3331090001 HH PPS REVENUE CREDIT

## 2021-02-24 ENCOUNTER — TELEPHONE (OUTPATIENT)
Dept: FAMILY MEDICINE CLINIC | Age: 66
End: 2021-02-24

## 2021-02-24 ENCOUNTER — HOME CARE VISIT (OUTPATIENT)
Dept: SCHEDULING | Facility: HOME HEALTH | Age: 66
End: 2021-02-24
Payer: MEDICARE

## 2021-02-24 PROCEDURE — 3331090002 HH PPS REVENUE DEBIT

## 2021-02-24 PROCEDURE — G0157 HHC PT ASSISTANT EA 15: HCPCS

## 2021-02-24 PROCEDURE — 3331090001 HH PPS REVENUE CREDIT

## 2021-02-25 ENCOUNTER — HOME CARE VISIT (OUTPATIENT)
Dept: SCHEDULING | Facility: HOME HEALTH | Age: 66
End: 2021-02-25
Payer: MEDICARE

## 2021-02-25 VITALS
DIASTOLIC BLOOD PRESSURE: 77 MMHG | OXYGEN SATURATION: 99 % | HEART RATE: 62 BPM | SYSTOLIC BLOOD PRESSURE: 120 MMHG | TEMPERATURE: 97.8 F

## 2021-02-25 PROCEDURE — 3331090002 HH PPS REVENUE DEBIT

## 2021-02-25 PROCEDURE — 3331090001 HH PPS REVENUE CREDIT

## 2021-02-25 PROCEDURE — G0158 HHC OT ASSISTANT EA 15: HCPCS

## 2021-02-25 NOTE — TELEPHONE ENCOUNTER
Valentine from [facility] called to check on the status of orders faxed over 2/22/2021    [phone]

## 2021-02-26 VITALS
OXYGEN SATURATION: 99 % | DIASTOLIC BLOOD PRESSURE: 80 MMHG | SYSTOLIC BLOOD PRESSURE: 125 MMHG | RESPIRATION RATE: 18 BRPM | HEART RATE: 60 BPM | TEMPERATURE: 98.1 F

## 2021-02-26 DIAGNOSIS — I10 ESSENTIAL HYPERTENSION: ICD-10-CM

## 2021-02-26 PROCEDURE — 3331090002 HH PPS REVENUE DEBIT

## 2021-02-26 PROCEDURE — 3331090001 HH PPS REVENUE CREDIT

## 2021-02-26 RX ORDER — AMLODIPINE BESYLATE 10 MG/1
TABLET ORAL
Qty: 90 TAB | Refills: 0 | Status: SHIPPED | OUTPATIENT
Start: 2021-02-26

## 2021-02-27 PROCEDURE — 3331090001 HH PPS REVENUE CREDIT

## 2021-02-27 PROCEDURE — 3331090002 HH PPS REVENUE DEBIT

## 2021-02-28 PROCEDURE — 3331090001 HH PPS REVENUE CREDIT

## 2021-02-28 PROCEDURE — 3331090002 HH PPS REVENUE DEBIT

## 2021-03-01 ENCOUNTER — HOME CARE VISIT (OUTPATIENT)
Dept: SCHEDULING | Facility: HOME HEALTH | Age: 66
End: 2021-03-01
Payer: MEDICARE

## 2021-03-01 PROCEDURE — 3331090001 HH PPS REVENUE CREDIT

## 2021-03-01 PROCEDURE — G0157 HHC PT ASSISTANT EA 15: HCPCS

## 2021-03-01 PROCEDURE — 3331090002 HH PPS REVENUE DEBIT

## 2021-03-01 PROCEDURE — G0158 HHC OT ASSISTANT EA 15: HCPCS

## 2021-03-02 VITALS
OXYGEN SATURATION: 98 % | DIASTOLIC BLOOD PRESSURE: 85 MMHG | TEMPERATURE: 98.2 F | RESPIRATION RATE: 20 BRPM | SYSTOLIC BLOOD PRESSURE: 130 MMHG | HEART RATE: 62 BPM

## 2021-03-02 PROCEDURE — 3331090001 HH PPS REVENUE CREDIT

## 2021-03-02 PROCEDURE — 3331090002 HH PPS REVENUE DEBIT

## 2021-03-03 PROCEDURE — 3331090001 HH PPS REVENUE CREDIT

## 2021-03-03 PROCEDURE — 3331090002 HH PPS REVENUE DEBIT

## 2021-03-04 ENCOUNTER — HOME CARE VISIT (OUTPATIENT)
Dept: SCHEDULING | Facility: HOME HEALTH | Age: 66
End: 2021-03-04
Payer: MEDICARE

## 2021-03-04 VITALS
TEMPERATURE: 97.9 F | OXYGEN SATURATION: 97 % | SYSTOLIC BLOOD PRESSURE: 132 MMHG | HEART RATE: 82 BPM | DIASTOLIC BLOOD PRESSURE: 78 MMHG

## 2021-03-04 PROCEDURE — 3331090002 HH PPS REVENUE DEBIT

## 2021-03-04 PROCEDURE — G0152 HHCP-SERV OF OT,EA 15 MIN: HCPCS

## 2021-03-04 PROCEDURE — 3331090001 HH PPS REVENUE CREDIT

## 2021-03-04 PROCEDURE — G0151 HHCP-SERV OF PT,EA 15 MIN: HCPCS

## 2021-03-05 VITALS
SYSTOLIC BLOOD PRESSURE: 124 MMHG | DIASTOLIC BLOOD PRESSURE: 72 MMHG | TEMPERATURE: 98.3 F | RESPIRATION RATE: 18 BRPM | OXYGEN SATURATION: 94 % | HEART RATE: 73 BPM

## 2021-03-15 DIAGNOSIS — Z86.73 HISTORY OF STROKE: ICD-10-CM

## 2021-03-15 RX ORDER — ATORVASTATIN CALCIUM 20 MG/1
20 TABLET, FILM COATED ORAL DAILY
Qty: 30 TAB | Refills: 2 | Status: SHIPPED | OUTPATIENT
Start: 2021-03-15 | End: 2021-06-14

## 2021-03-15 NOTE — TELEPHONE ENCOUNTER
Requested Prescriptions     Pending Prescriptions Disp Refills    atorvastatin (LIPITOR) 20 mg tablet 30 Tab 2     Sig: Take 1 Tab by mouth daily. Richi Whitmore called for their medication refill.     Last Office visit:  02-  Last labs:  12-  Follow up visit:  04-  Last date prescribe 12-    Please Advise

## 2021-03-15 NOTE — TELEPHONE ENCOUNTER
Requested Prescriptions     Pending Prescriptions Disp Refills    atorvastatin (LIPITOR) 20 mg tablet 30 Tab 2     Sig: Take 1 Tab by mouth daily.

## 2021-05-06 ENCOUNTER — OFFICE VISIT (OUTPATIENT)
Dept: FAMILY MEDICINE CLINIC | Age: 66
End: 2021-05-06
Payer: MEDICARE

## 2021-05-06 VITALS
SYSTOLIC BLOOD PRESSURE: 138 MMHG | DIASTOLIC BLOOD PRESSURE: 86 MMHG | BODY MASS INDEX: 29.85 KG/M2 | RESPIRATION RATE: 18 BRPM | HEART RATE: 56 BPM | HEIGHT: 71 IN | TEMPERATURE: 97.6 F | OXYGEN SATURATION: 97 %

## 2021-05-06 DIAGNOSIS — N40.1 BENIGN LOCALIZED PROSTATIC HYPERPLASIA WITH LOWER URINARY TRACT SYMPTOMS (LUTS): ICD-10-CM

## 2021-05-06 DIAGNOSIS — G89.29 CHRONIC MIDLINE LOW BACK PAIN WITH RIGHT-SIDED SCIATICA: ICD-10-CM

## 2021-05-06 DIAGNOSIS — Z86.73 HISTORY OF CVA (CEREBROVASCULAR ACCIDENT): ICD-10-CM

## 2021-05-06 DIAGNOSIS — I10 ESSENTIAL HYPERTENSION: Primary | ICD-10-CM

## 2021-05-06 DIAGNOSIS — G81.94 LEFT HEMIPARESIS (HCC): ICD-10-CM

## 2021-05-06 DIAGNOSIS — M54.41 CHRONIC MIDLINE LOW BACK PAIN WITH RIGHT-SIDED SCIATICA: ICD-10-CM

## 2021-05-06 DIAGNOSIS — I82.491 DEEP VEIN THROMBOSIS (DVT) OF OTHER VEIN OF RIGHT LOWER EXTREMITY, UNSPECIFIED CHRONICITY (HCC): ICD-10-CM

## 2021-05-06 PROCEDURE — G8754 DIAS BP LESS 90: HCPCS | Performed by: FAMILY MEDICINE

## 2021-05-06 PROCEDURE — G8510 SCR DEP NEG, NO PLAN REQD: HCPCS | Performed by: FAMILY MEDICINE

## 2021-05-06 PROCEDURE — 1101F PT FALLS ASSESS-DOCD LE1/YR: CPT | Performed by: FAMILY MEDICINE

## 2021-05-06 PROCEDURE — G8427 DOCREV CUR MEDS BY ELIG CLIN: HCPCS | Performed by: FAMILY MEDICINE

## 2021-05-06 PROCEDURE — G8536 NO DOC ELDER MAL SCRN: HCPCS | Performed by: FAMILY MEDICINE

## 2021-05-06 PROCEDURE — G8752 SYS BP LESS 140: HCPCS | Performed by: FAMILY MEDICINE

## 2021-05-06 PROCEDURE — 99213 OFFICE O/P EST LOW 20 MIN: CPT | Performed by: FAMILY MEDICINE

## 2021-05-06 PROCEDURE — G8417 CALC BMI ABV UP PARAM F/U: HCPCS | Performed by: FAMILY MEDICINE

## 2021-05-06 PROCEDURE — 3017F COLORECTAL CA SCREEN DOC REV: CPT | Performed by: FAMILY MEDICINE

## 2021-05-06 RX ORDER — CYCLOBENZAPRINE HCL 10 MG
TABLET ORAL
Qty: 90 TAB | Refills: 1 | Status: SHIPPED | OUTPATIENT
Start: 2021-05-06
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
## 2021-05-24 ENCOUNTER — OFFICE VISIT (OUTPATIENT)
Dept: FAMILY MEDICINE CLINIC | Age: 66
End: 2021-05-24
Payer: MEDICARE

## 2021-05-24 VITALS
RESPIRATION RATE: 16 BRPM | SYSTOLIC BLOOD PRESSURE: 115 MMHG | TEMPERATURE: 96 F | HEIGHT: 71 IN | BODY MASS INDEX: 30.94 KG/M2 | WEIGHT: 221 LBS | OXYGEN SATURATION: 97 % | DIASTOLIC BLOOD PRESSURE: 77 MMHG | HEART RATE: 60 BPM

## 2021-05-24 DIAGNOSIS — M24.549 CONTRACTURE OF HAND: Primary | ICD-10-CM

## 2021-05-24 DIAGNOSIS — I82.491 DEEP VEIN THROMBOSIS (DVT) OF OTHER VEIN OF RIGHT LOWER EXTREMITY, UNSPECIFIED CHRONICITY (HCC): ICD-10-CM

## 2021-05-24 DIAGNOSIS — G81.94 LEFT HEMIPARESIS (HCC): ICD-10-CM

## 2021-05-24 DIAGNOSIS — N40.1 BENIGN LOCALIZED PROSTATIC HYPERPLASIA WITH LOWER URINARY TRACT SYMPTOMS (LUTS): ICD-10-CM

## 2021-05-24 DIAGNOSIS — Z86.73 HISTORY OF CVA (CEREBROVASCULAR ACCIDENT): ICD-10-CM

## 2021-05-24 PROCEDURE — G8510 SCR DEP NEG, NO PLAN REQD: HCPCS | Performed by: FAMILY MEDICINE

## 2021-05-24 PROCEDURE — G8536 NO DOC ELDER MAL SCRN: HCPCS | Performed by: FAMILY MEDICINE

## 2021-05-24 PROCEDURE — 99213 OFFICE O/P EST LOW 20 MIN: CPT | Performed by: FAMILY MEDICINE

## 2021-05-24 PROCEDURE — 1101F PT FALLS ASSESS-DOCD LE1/YR: CPT | Performed by: FAMILY MEDICINE

## 2021-05-24 PROCEDURE — G8417 CALC BMI ABV UP PARAM F/U: HCPCS | Performed by: FAMILY MEDICINE

## 2021-05-24 PROCEDURE — 3017F COLORECTAL CA SCREEN DOC REV: CPT | Performed by: FAMILY MEDICINE

## 2021-05-24 PROCEDURE — G8754 DIAS BP LESS 90: HCPCS | Performed by: FAMILY MEDICINE

## 2021-05-24 PROCEDURE — G8752 SYS BP LESS 140: HCPCS | Performed by: FAMILY MEDICINE

## 2021-05-24 PROCEDURE — G8427 DOCREV CUR MEDS BY ELIG CLIN: HCPCS | Performed by: FAMILY MEDICINE

## 2021-05-24 NOTE — PROGRESS NOTES
Chief Complaint   Patient presents with    Follow-up     dmv form     1. Have you been to the ER, urgent care clinic since your last visit? Hospitalized since your last visit? No    2. Have you seen or consulted any other health care providers outside of the [address] [place] [address] since your last visit? Include any pap smears or colon screening.  No
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
## 2021-06-08 ENCOUNTER — OFFICE VISIT (OUTPATIENT)
Dept: FAMILY MEDICINE CLINIC | Age: 66
End: 2021-06-08
Payer: MEDICARE

## 2021-06-08 VITALS
BODY MASS INDEX: 31.92 KG/M2 | WEIGHT: 228 LBS | TEMPERATURE: 98.1 F | OXYGEN SATURATION: 99 % | DIASTOLIC BLOOD PRESSURE: 71 MMHG | SYSTOLIC BLOOD PRESSURE: 108 MMHG | HEIGHT: 71 IN | HEART RATE: 66 BPM | RESPIRATION RATE: 18 BRPM

## 2021-06-08 DIAGNOSIS — Z86.73 HISTORY OF CVA (CEREBROVASCULAR ACCIDENT): ICD-10-CM

## 2021-06-08 DIAGNOSIS — I10 ESSENTIAL HYPERTENSION: ICD-10-CM

## 2021-06-08 DIAGNOSIS — I82.491 DEEP VEIN THROMBOSIS (DVT) OF OTHER VEIN OF RIGHT LOWER EXTREMITY, UNSPECIFIED CHRONICITY (HCC): ICD-10-CM

## 2021-06-08 DIAGNOSIS — N47.2 PARAPHIMOSIS: Primary | ICD-10-CM

## 2021-06-08 DIAGNOSIS — N40.1 BENIGN LOCALIZED PROSTATIC HYPERPLASIA WITH LOWER URINARY TRACT SYMPTOMS (LUTS): ICD-10-CM

## 2021-06-08 PROCEDURE — G8754 DIAS BP LESS 90: HCPCS | Performed by: FAMILY MEDICINE

## 2021-06-08 PROCEDURE — G8427 DOCREV CUR MEDS BY ELIG CLIN: HCPCS | Performed by: FAMILY MEDICINE

## 2021-06-08 PROCEDURE — G8417 CALC BMI ABV UP PARAM F/U: HCPCS | Performed by: FAMILY MEDICINE

## 2021-06-08 PROCEDURE — G8752 SYS BP LESS 140: HCPCS | Performed by: FAMILY MEDICINE

## 2021-06-08 PROCEDURE — G8536 NO DOC ELDER MAL SCRN: HCPCS | Performed by: FAMILY MEDICINE

## 2021-06-08 PROCEDURE — 3017F COLORECTAL CA SCREEN DOC REV: CPT | Performed by: FAMILY MEDICINE

## 2021-06-08 PROCEDURE — G8510 SCR DEP NEG, NO PLAN REQD: HCPCS | Performed by: FAMILY MEDICINE

## 2021-06-08 PROCEDURE — 1101F PT FALLS ASSESS-DOCD LE1/YR: CPT | Performed by: FAMILY MEDICINE

## 2021-06-08 PROCEDURE — 99213 OFFICE O/P EST LOW 20 MIN: CPT | Performed by: FAMILY MEDICINE
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
## 2021-06-13 DIAGNOSIS — Z86.73 HISTORY OF STROKE: ICD-10-CM

## 2021-06-14 RX ORDER — ATORVASTATIN CALCIUM 20 MG/1
TABLET, FILM COATED ORAL
Qty: 30 TABLET | Refills: 2 | Status: SHIPPED | OUTPATIENT
Start: 2021-06-14 | End: 2021-10-05

## 2021-07-27 ENCOUNTER — TELEPHONE (OUTPATIENT)
Dept: FAMILY MEDICINE CLINIC | Age: 66
End: 2021-07-27

## 2021-07-27 NOTE — TELEPHONE ENCOUNTER
Patient's daughter states she spoke with [employer]ome Form and was told her claim was closed because the [facility] paperwork was sent over on 6/21/2021 was never faxed back.

## 2021-07-30 NOTE — TELEPHONE ENCOUNTER
Patient's daughter came up to the office regarding the same matter. I apologized and told her there wasn't anything I could do, as the provider and nurse are out of the office until Monday. Please contact her.

## 2021-09-21 ENCOUNTER — OFFICE VISIT (OUTPATIENT)
Dept: FAMILY MEDICINE CLINIC | Age: 66
End: 2021-09-21
Payer: MEDICARE

## 2021-09-21 VITALS
WEIGHT: 258 LBS | OXYGEN SATURATION: 97 % | HEIGHT: 71 IN | SYSTOLIC BLOOD PRESSURE: 136 MMHG | HEART RATE: 68 BPM | TEMPERATURE: 95.9 F | DIASTOLIC BLOOD PRESSURE: 77 MMHG | BODY MASS INDEX: 36.12 KG/M2 | RESPIRATION RATE: 18 BRPM

## 2021-09-21 DIAGNOSIS — R80.9 PROTEINURIA, UNSPECIFIED TYPE: ICD-10-CM

## 2021-09-21 DIAGNOSIS — E07.9 THYROID DISORDER: ICD-10-CM

## 2021-09-21 DIAGNOSIS — R60.1 ANASARCA: ICD-10-CM

## 2021-09-21 DIAGNOSIS — I10 ESSENTIAL HYPERTENSION: Primary | ICD-10-CM

## 2021-09-21 DIAGNOSIS — R60.0 PEDAL EDEMA: ICD-10-CM

## 2021-09-21 DIAGNOSIS — Z23 ENCOUNTER FOR IMMUNIZATION: ICD-10-CM

## 2021-09-21 DIAGNOSIS — Z09 HOSPITAL DISCHARGE FOLLOW-UP: ICD-10-CM

## 2021-09-21 PROCEDURE — G8752 SYS BP LESS 140: HCPCS | Performed by: FAMILY MEDICINE

## 2021-09-21 PROCEDURE — 3017F COLORECTAL CA SCREEN DOC REV: CPT | Performed by: FAMILY MEDICINE

## 2021-09-21 PROCEDURE — G8536 NO DOC ELDER MAL SCRN: HCPCS | Performed by: FAMILY MEDICINE

## 2021-09-21 PROCEDURE — 90694 VACC AIIV4 NO PRSRV 0.5ML IM: CPT | Performed by: FAMILY MEDICINE

## 2021-09-21 PROCEDURE — 1111F DSCHRG MED/CURRENT MED MERGE: CPT | Performed by: FAMILY MEDICINE

## 2021-09-21 PROCEDURE — G0008 ADMIN INFLUENZA VIRUS VAC: HCPCS | Performed by: FAMILY MEDICINE

## 2021-09-21 PROCEDURE — G8427 DOCREV CUR MEDS BY ELIG CLIN: HCPCS | Performed by: FAMILY MEDICINE

## 2021-09-21 PROCEDURE — G8417 CALC BMI ABV UP PARAM F/U: HCPCS | Performed by: FAMILY MEDICINE

## 2021-09-21 PROCEDURE — G8510 SCR DEP NEG, NO PLAN REQD: HCPCS | Performed by: FAMILY MEDICINE

## 2021-09-21 PROCEDURE — 99214 OFFICE O/P EST MOD 30 MIN: CPT | Performed by: FAMILY MEDICINE

## 2021-09-21 PROCEDURE — G8754 DIAS BP LESS 90: HCPCS | Performed by: FAMILY MEDICINE

## 2021-09-21 PROCEDURE — 1101F PT FALLS ASSESS-DOCD LE1/YR: CPT | Performed by: FAMILY MEDICINE

## 2021-09-21 RX ORDER — FUROSEMIDE 20 MG/1
20 TABLET ORAL 2 TIMES DAILY
Qty: 60 TABLET | Refills: 0 | Status: SHIPPED | OUTPATIENT
Start: 2021-09-21 | End: 2021-09-21

## 2021-09-21 RX ORDER — AMLODIPINE BESYLATE 10 MG/1
10 TABLET ORAL
Qty: 90 TABLET | Refills: 0 | Status: CANCELLED | OUTPATIENT
Start: 2021-09-21

## 2021-09-21 RX ORDER — POTASSIUM CHLORIDE 750 MG/1
10 TABLET, EXTENDED RELEASE ORAL DAILY
Qty: 90 TABLET | Refills: 0 | Status: SHIPPED | OUTPATIENT
Start: 2021-09-21 | End: 2021-12-20

## 2021-09-21 RX ORDER — CEPHALEXIN 500 MG/1
500 CAPSULE ORAL 3 TIMES DAILY
Qty: 27 CAPSULE | Refills: 0 | Status: SHIPPED | OUTPATIENT
Start: 2021-09-21 | End: 2021-09-30

## 2021-09-21 RX ORDER — FUROSEMIDE 20 MG/1
TABLET ORAL
Qty: 180 TABLET | Refills: 0 | Status: SHIPPED | OUTPATIENT
Start: 2021-09-21

## 2021-09-21 RX ORDER — POTASSIUM CHLORIDE 750 MG/1
10 TABLET, EXTENDED RELEASE ORAL DAILY
Qty: 30 TABLET | Refills: 0 | Status: SHIPPED | OUTPATIENT
Start: 2021-09-21 | End: 2021-09-21

## 2021-09-21 NOTE — PROGRESS NOTES
Chief Complaint   Patient presents with    Swelling     feet, legs, hands, and [address] Follow Up     1. Have you been to the ER, urgent care clinic since your last visit? Hospitalized since your last visit? Yes When: 09- Where: [facility] Reason for visit: swelling    2. Have you seen or consulted any other health care providers outside of the [facility] since your last visit? Include any pap smears or colon screening.  No
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
## 2021-10-06 ENCOUNTER — OFFICE VISIT (OUTPATIENT)
Dept: FAMILY MEDICINE CLINIC | Age: 66
End: 2021-10-06
Payer: MEDICARE

## 2021-10-06 VITALS
SYSTOLIC BLOOD PRESSURE: 134 MMHG | WEIGHT: 268 LBS | BODY MASS INDEX: 37.52 KG/M2 | HEIGHT: 71 IN | RESPIRATION RATE: 16 BRPM | HEART RATE: 86 BPM | TEMPERATURE: 97 F | DIASTOLIC BLOOD PRESSURE: 80 MMHG | OXYGEN SATURATION: 96 %

## 2021-10-06 DIAGNOSIS — Z86.73 HISTORY OF STROKE: ICD-10-CM

## 2021-10-06 DIAGNOSIS — R39.9 LOWER URINARY TRACT SYMPTOMS (LUTS): ICD-10-CM

## 2021-10-06 DIAGNOSIS — I10 ESSENTIAL HYPERTENSION: ICD-10-CM

## 2021-10-06 DIAGNOSIS — I82.491 DEEP VEIN THROMBOSIS (DVT) OF OTHER VEIN OF RIGHT LOWER EXTREMITY, UNSPECIFIED CHRONICITY (HCC): ICD-10-CM

## 2021-10-06 DIAGNOSIS — R60.1 ANASARCA: Primary | ICD-10-CM

## 2021-10-06 PROCEDURE — G8752 SYS BP LESS 140: HCPCS | Performed by: FAMILY MEDICINE

## 2021-10-06 PROCEDURE — G8536 NO DOC ELDER MAL SCRN: HCPCS | Performed by: FAMILY MEDICINE

## 2021-10-06 PROCEDURE — 99215 OFFICE O/P EST HI 40 MIN: CPT | Performed by: FAMILY MEDICINE

## 2021-10-06 PROCEDURE — G8417 CALC BMI ABV UP PARAM F/U: HCPCS | Performed by: FAMILY MEDICINE

## 2021-10-06 PROCEDURE — G8510 SCR DEP NEG, NO PLAN REQD: HCPCS | Performed by: FAMILY MEDICINE

## 2021-10-06 PROCEDURE — G8427 DOCREV CUR MEDS BY ELIG CLIN: HCPCS | Performed by: FAMILY MEDICINE

## 2021-10-06 PROCEDURE — 3017F COLORECTAL CA SCREEN DOC REV: CPT | Performed by: FAMILY MEDICINE

## 2021-10-06 PROCEDURE — 1101F PT FALLS ASSESS-DOCD LE1/YR: CPT | Performed by: FAMILY MEDICINE

## 2021-10-06 PROCEDURE — G8754 DIAS BP LESS 90: HCPCS | Performed by: FAMILY MEDICINE

## 2021-10-06 NOTE — PROGRESS NOTES
Chief Complaint   Patient presents with    Follow Up Chronic Condition     fluid buildup    Abdominal Pain     1. Have you been to the ER, urgent care clinic since your last visit? Hospitalized since your last visit? No    2. Have you seen or consulted any other health care providers outside of the [address] [place] [address] since your last visit? Include any pap smears or colon screening.  No

## 2021-10-06 NOTE — PATIENT INSTRUCTIONS
Patient with anasarca, abdominal swelling and SOB, has gained 10 pounds over 2 days despite oral lasix. Referred to ED for evaluation and management. Will need IV diuresis.
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
## 2021-10-18 ENCOUNTER — PATIENT OUTREACH (OUTPATIENT)
Dept: CASE MANAGEMENT | Age: 66
End: 2021-10-18

## 2021-10-18 NOTE — PROGRESS NOTES
Care Transitions Initial     Call/review  within 2 business days of discharge: Yes     Patient: Brooke Kowalski Patient : 1955 MRN: [id number]    Last Discharge [address]     None          Was this an external facility discharge? Yes, 10/17/21 Discharge Facility:   [facility]     Challenges to be reviewed by the provider   Additional needs identified to be addressed with provider: no    General Information. Per chart review patient transitioned to [facility] for continuation of care. Method of communication with provider : none     Care Transitions Nurse ( CTN) to follow up within approximately 14 days of hospital discharge.

## 2021-11-08 ENCOUNTER — PATIENT OUTREACH (OUTPATIENT)
Dept: CASE MANAGEMENT | Age: 66
End: 2021-11-08

## 2021-11-08 NOTE — PROGRESS NOTES
Care Transitions Initial Call     Call within 2 business days of discharge:         Patient: Tavia Mesa Patient : 1955 MRN: [id number]    Last Discharge [address]     None          Was this an external facility discharge? Yes, [facility]    Date of discharge:  10/17/21. Patient transitioned to a skilled nursing facility post hospital discharge. Per patient's family patient has been discharged from the skilled nursing facility and has returned to home. Care Transitions Nurse ( CTN) called patient's listed phone number. A female answered and stated that she is patient's daughter Janis Barbour. Tony Dowell is listed on patient's  PHI form. Ms. Dung Rubio related that patient has been discharged from the skilled nursing facility and has returned to home. Ms. Dung Rubio reports that patient is having a little swelling but has been in touch with the nurse at patient's kidney doctor's office regarding this. CTN attempted to complete medication review with patient's daughter. CTN to call patient or patient's daughter back as patient's daughter related she is at work and related that she will be available in the morning of 2021. Patient's daughter, agreeable with CTN forwarding request to PCP office re: scheduling Transition of Care appointment. Request for PCP Transition of Care appointment submitted.        Non-[facility] follow up appointment(s):   -Nephrology   - Urology

## 2021-11-09 ENCOUNTER — PATIENT OUTREACH (OUTPATIENT)
Dept: CASE MANAGEMENT | Age: 66
End: 2021-11-09

## 2021-11-09 NOTE — PROGRESS NOTES
Care Transitions Initial Call    Call within 2 business days of discharge: Yes     Patient: Brooke Kowalski Patient : 1955 MRN: [id number]    Last Discharge [address]     None           Was this an external facility discharge? Yes, 10/17/21 Discharge Facility:   [facility]     Challenges to be reviewed by the provider   Additional needs identified to be addressed with provider: yes    Patient's daughter reports that some swelling is returning in patient's legs and hands. Patient's daughter reports that she has noted 1-2 areas on patient's leg that appear to be blistered/broken open. Patient's daughter reports that patient has a follow up appointment with his kidney doctor on 11-10-21 and patient will follow up with his kidney doctor regarding swelling at that time. Patient was referred to physicians or ED as needed. Would you please review discharge medications that patient was discharged on from the skilled nursing facility? I do not have access to  this information. Patient's family reports that from her memory patient is taking the following medications:   - Lasix  - Losartan  - Amlodipine  - Potassium  - Xarelto   - Atorvastin  - Flomax     Please see discharge information from [facility] discharge 10/17/21 as needed (information at time of  dc to the skilled nursing facility). Some medications were noted to be changed at that time. Patient encouraged to follow up with providers if she has any concerns/questions regarding patient's medications. It was suggested to family member that she might want to take patient's list of discharge medications from the skilled nursing facility with him to his doctor's appointments for review. Home Health has not yet been out to visit with patient, per patient's family member. Family is following up with this.       Thank you          Method of communication with provider :  Chart routing     Reviewed  [id number] related testing which was available at this time. Test results were negative ( 10/6/21)   Please verify in care everywhere. Advance Care Planning:   Does patient have an Advance Directive:  not noted to be on file at this time     Inpatient Readmission Risk score: No data recorded  Was this a readmission? no   Patient stated reason for the admission:   N/a     Patients top risk factors for readmission:  Medical condition      Interventions to address risk factors: Obtained and reviewed discharge summary and/or continuity of care documents from [facility] discharge of 10/17/21. Care Transition Nurse (CTN) contacted the family by telephone to perform post hospital discharge assessment. Verified name and  with family as identifiers. Provided introduction to self, and explanation of the CTN role. Ms. Mahogany Welch is listed on patient's PHI form. Ms. Raquel Mane advises that her current last name is Sun Rossi and that she is patient's daughter. CTN reviewed discharge instructions, medical action plan and red flags with family who verbalized understanding. Reviewed appropriate site of care based on symptoms and resources available to patient including: PCP, Specialist, After hours contact number-Provider office phone number  and When to call 911. Family given an opportunity to ask questions and does not have any further questions or concerns at this time. The family agrees to contact the PCP office for questions related to their healthcare. Medication reconciliation was performed with family, who verbalizes understanding of administration of home medications. Family member advises that she and patient are together and that they are not home currently.  Family member gave a brief overview from memory of what medication patient is taking:   -  Lasix   - Losartan   - Amlodipine   - Potassium   - Xarelto   - Atorvastin   - Flomax       Referral to Pharm D needed: will defer to PCP for referral as needed      Home Health/Outpatient orders at discharge ( From SNF)  Yes  [address]:  [address] [place] [address]   Date of initial visit: To be scheduled per patient's s family. Durable Medical Equipment ordered at discharge ( from SNF):   Unknown at this time   [address]:   [address] [facility] [place]:     Patient's family member relates that patient uses a cane to assist with ambulation. Covid Risk Education     CTN reviewed discharge instructions, medical action plan and red flag symptoms with the family who verbalized understanding. Discussed COVID vaccination status: not at this time. Education provided on COVID-19 vaccination as appropriate. Will follow up at needed. Was patient discharged with a pulse oximeter? None noted at this time. Discussed follow-up appointments. If no appointment was previously scheduled, appointment scheduling offered: Request for PCP Transition of Care appointment submitted. . Is follow up appointment scheduled within 7 days of discharge? Appointment to be scheduled. [facility] follow up appointment(s):   Future Appointments   Date Time Provider Hank Georges   11/22/2021 10:30 AM Chris Ji MD [facility] BS AMB   12/2/2021  8:30 AM Natalie Hawkins MD [facility] BS AMB     Non-[facility] follow up appointment(s):   - nephrology  - urology    Plan for follow-up call in 1-2 days /asneedded based on severity of symptoms and risk factors. Plan for next call:   - symptom management   - follow up with specialty/PCP appointments   - follow up with medication review as needed    CTN provided contact information for future needs. Goals Addressed                 This Visit's Progress     Attends follow-up appointments as directed.  Prevent complications post hospitalization.  Supportive resources in place to maintain patient in the community (ie.  Home Health, DME equipment, refer to, medication assistant plan, etc.)  Understands red flags post discharge. Patient's daughter would like a call from the MSW with the Care Transitions Team of [address] [place] [address],[facility] ( [facility]) regarding transportation.

## 2021-11-10 ENCOUNTER — PATIENT OUTREACH (OUTPATIENT)
Dept: CASE MANAGEMENT | Age: 66
End: 2021-11-10

## 2021-11-10 NOTE — PROGRESS NOTES
Social Work Note  11/10/2021      Date of referral: 11/09/2021  Referral received from: NATALIE Patel RN  Reason for referral: Assist the patient with community resources for optional transportation    MSW attempted to contact the patient by telephone. Left message with information to return call. Will attempt to reach the patient at a later time.

## 2021-11-17 ENCOUNTER — VIRTUAL VISIT (OUTPATIENT)
Dept: FAMILY MEDICINE CLINIC | Age: 66
End: 2021-11-17
Payer: MEDICARE

## 2021-11-17 DIAGNOSIS — R53.1 WEAKNESS GENERALIZED: ICD-10-CM
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
## 2021-11-17 DIAGNOSIS — N02.2 MEMBRANOUS NEPHROPATHY DETERMINED BY BIOPSY: ICD-10-CM

## 2021-11-17 DIAGNOSIS — R60.1 ANASARCA: Primary | ICD-10-CM

## 2021-11-17 PROBLEM — Z86.718 HISTORY OF DVT (DEEP VEIN THROMBOSIS): Status: ACTIVE | Noted: 2021-10-07

## 2021-11-17 PROBLEM — N40.1 BENIGN PROSTATIC HYPERPLASIA WITH LOWER URINARY TRACT SYMPTOMS: Status: ACTIVE | Noted: 2021-10-07

## 2021-11-17 PROBLEM — I10 ESSENTIAL HYPERTENSION: Status: ACTIVE | Noted: 2021-10-07

## 2021-11-17 PROBLEM — N04.9 NEPHROTIC SYNDROME: Status: ACTIVE | Noted: 2021-10-07

## 2021-11-17 PROCEDURE — 99443 PR PHYS/QHP TELEPHONE EVALUATION 21-30 MIN: CPT | Performed by: FAMILY MEDICINE

## 2021-11-17 RX ORDER — LOSARTAN POTASSIUM 25 MG/1
25 TABLET ORAL DAILY
COMMUNITY
Start: 2021-10-16

## 2021-11-17 NOTE — PROGRESS NOTES
Aliya Osorio is a 76 y.o. male, evaluated via audio-only technology on 11/17/2021 for Follow Up Chronic Condition (discharged from rehab)  . Assessment & Plan:       ICD-10-CM ICD-9-CM    1. Anasarca  R60.1 782.3    2. Membranous nephropathy determined by biopsy  N02.2 583.1    3. Fall, subsequent encounter  W19. XXXD V58.89      E888.9    4. Weakness generalized  R53.1 780.79      Subjective:   Aliya Osorio had a phone visit for follow-up care. Patient is a gentleman who was recently admitted with anasarca and diagnosed with nephrotic syndrome. Daughter states that he was found to have membranous nephropathy. He is on diuretic medication but his weight has kept going up. I did call his house for today's visit and the the daughter had just got home. Patient was on the floor and she is unsure as to how long he has been on the floor. His weight has increased by more than 20 pounds since he went home the last 2 weeks. He is weak and short of breath. Patient is able to talk on the phone but does admit that he is weak and unable to care for himself. Daughter states that home health was supposed to start coming in on Friday this week. Patient however is unable to walk due to the edema. They had seen the nephrologist last week on Wednesday and his medication was adjusted. He was discharged on Bumex. This has not helped much. Given the worsening symptoms and the anasarca with shortness of breath and more than 20 pound weight gain patient will go to the emergency room for an evaluation. May need to have IV diuresis. Patient and daughter are agreeable to this. Prior to Admission medications    Medication Sig Start Date End Date Taking? Authorizing Provider   losartan (COZAAR) 25 mg tablet Take 25 mg by mouth daily.  10/16/21  Yes Provider, Historical   atorvastatin (LIPITOR) 20 mg tablet TAKE 1 TABLET BY MOUTH DAILY 10/5/21  Yes Natalie Calvo MD   tamsulosin (FLOMAX) 0.4 mg capsule Take 2 Capsules by mouth daily (after dinner). 10/4/21  Yes Deeanna Angelucci, PA   finasteride (PROSCAR) 5 mg tablet Take 1 Tablet by mouth daily. 10/4/21  Yes Deeanna Angelucci, PA   furosemide (LASIX) 20 mg tablet TAKE 1 TABLET BY MOUTH TWICE DAILY 9/21/21  Yes Lafonda Kayser, MD   potassium chloride (KLOR-CON) 10 mEq tablet TAKE 1 TABLET BY MOUTH DAILY 9/21/21  Yes Natalie Barnett MD   rivaroxaban (XARELTO) 20 mg tab tablet Take 1 Tab by mouth daily (with breakfast). on day 22Take 20 mg by mouth daily (with breakfast). 5/6/21  Yes Lafonda Kayser, MD   cyclobenzaprine (FLEXERIL) 10 mg tablet TAKE 1 TABLET BY MOUTH THREE TIMES DAILY AS NEEDED FOR MUSCLE SPASMS 5/6/21  Yes Natalie Barnett MD   ergocalciferol (ERGOCALCIFEROL) 1,250 mcg (50,000 unit) capsule Take 1 Cap by mouth every seven (7) days. 12/3/20  Yes Lafonda Kayser, MD   aspirin delayed-release 81 mg tablet Take 1 Tab by mouth daily. 12/3/20  Yes Lafonda Kayser, MD   amLODIPine (NORVASC) 10 mg tablet TAKE 1 TABLET BY MOUTH EVERY NIGHT  Patient not taking: Reported on 11/17/2021 2/26/21   Kasi Webb Smoker, NP   acetaminophen (TYLENOL) 500 mg tablet Take 500 mg by mouth every six (6) hours as needed for Pain. Patient not taking: Reported on 11/9/2021    Provider, Historical     ROS Review of all systems is negative except as noted above in the HPI. Patient-Reported Vitals 11/17/2021   Patient-Reported Weight 268 lbs       Lakshmi Hannah, who was evaluated through a patient-initiated, synchronous (real-time) audio only encounter, and/or her healthcare decision maker, is aware that it is a billable service, with coverage as determined by his insurance carrier. He provided verbal consent to proceed: Yes. He has not had a related appointment within my department in the past 7 days or scheduled within the next 24 hours.     On this date 11/17/2021 I have spent 30 minutes reviewing previous notes, test results and face to face (virtual) with the patient discussing the diagnosis and importance of compliance with the treatment plan as well as documenting on the day of the visit.     Kim Matos MD

## 2021-11-18 ENCOUNTER — PATIENT OUTREACH (OUTPATIENT)
Dept: CASE MANAGEMENT | Age: 66
End: 2021-11-18

## 2021-11-18 NOTE — PROGRESS NOTES
Care Transitions Follow Up     Per Chart Review:     Patient attended PCP follow up appointment on 11/17/21. Patient currently admitted into [facility]     No outreach planned at this time.

## 2021-11-24 ENCOUNTER — PATIENT OUTREACH (OUTPATIENT)
Dept: CASE MANAGEMENT | Age: 66
End: 2021-11-24

## 2021-11-24 NOTE — PROGRESS NOTES
Social Work Note  11/24/2021      Date of referral: 11/09/2021  Referral received from: NATALIE Orlando RN  Reason for referral: Assist the patient with community resources for optional transportation     MSW attempted to contact the patient by telephone.      Left message with information to return call.      Will attempt to reach the patient at a later time.

## 2021-11-29 ENCOUNTER — PATIENT OUTREACH (OUTPATIENT)
Dept: CASE MANAGEMENT | Age: 66
End: 2021-11-29

## 2021-11-29 NOTE — PROGRESS NOTES
Transitions of Care     Per chart review:     Patient appears to be admitted into a [facility] facility. No outreach planned at this time.

## 2021-12-07 ENCOUNTER — PATIENT OUTREACH (OUTPATIENT)
Dept: CASE MANAGEMENT | Age: 66
End: 2021-12-07

## 2021-12-07 NOTE — PROGRESS NOTES
Transition of Care Coordination/Hospital to Post Acute Facility:     Date/Time:  12/7/2021 10:44 AM    Patient was admitted to [facility] on 11/18/21. Patient was discharged 12/3/21 to [facility]  for continuation of care. Top Challenges reviewed    N/A           Patient discharged to a  Non SNF [facility] facility. Care Transitions Nurse ( CTN) to follow up in approximately 14 days.

## 2021-12-14 ENCOUNTER — PATIENT OUTREACH (OUTPATIENT)
Dept: CASE MANAGEMENT | Age: 66
End: 2021-12-14

## 2021-12-14 NOTE — PROGRESS NOTES
Transitions of Care Follow Up. Care Transitions Nurse ( CTN) attempted to reach patient by phone call to determine if he had been discharged from the skilled nursing facility level of care. A female, who identified her self as patient's daughter, Ms. Haven Ashby answered the phone call. Ms. Vivien Dykes advises that patient has not been discharged from the skilled nursing facility.

## 2021-12-15 ENCOUNTER — PATIENT OUTREACH (OUTPATIENT)
Dept: CASE MANAGEMENT | Age: 66
End: 2021-12-15

## 2021-12-15 NOTE — PROGRESS NOTES
Social Work Note  12/15/2021    Date of referral: 11/09/2021  Referral received from: NATALIE Thurman RN  Reason for referral: Assist the patient with community resources for optional transportation    Patient was admitted to [facility] from 11/18/2021 - 12/3/2021. He transitioned to [facility] and [address] [place] [address] for short term rehab. MSW will follow patient once discharged home.

## 2021-12-18 DIAGNOSIS — R60.1 ANASARCA: ICD-10-CM

## 2021-12-18 DIAGNOSIS — R60.0 PEDAL EDEMA: ICD-10-CM

## 2021-12-20 RX ORDER — POTASSIUM CHLORIDE 750 MG/1
10 TABLET, EXTENDED RELEASE ORAL DAILY
Qty: 90 TABLET | Refills: 0 | Status: SHIPPED | OUTPATIENT
Start: 2021-12-20

## 2022-01-05 ENCOUNTER — PATIENT OUTREACH (OUTPATIENT)
Dept: CASE MANAGEMENT | Age: 67
End: 2022-01-05

## 2022-01-05 NOTE — PROGRESS NOTES
Transition of Care      Date/Time:  1/5/2022 5:55 PM    Care Transitions Nurse ( CTN) called [facility] and spoke with a staff member to determine if patient has been discharged from the skilled nursing facility  level of care. CTN was told that there is not a resident by that name at the facility. CTN will attempt to follow up with patient/family regarding transition of care.

## 2022-01-06 ENCOUNTER — PATIENT OUTREACH (OUTPATIENT)
Dept: CASE MANAGEMENT | Age: 67
End: 2022-01-06

## 2022-01-10 ENCOUNTER — PATIENT OUTREACH (OUTPATIENT)
Dept: CASE MANAGEMENT | Age: 67
End: 2022-01-10

## 2022-01-10 NOTE — PROGRESS NOTES
Transitions of Care     Care Transitions Nurse ( CTN) attempted to contact patient for follow up. A female answered the phone call and identified herself as patient's daughter Ms. Gomez. Ms. Tawnya Friends advises that patient had passed away. CTN offered condolences.

## 2022-03-08 NOTE — TELEPHONE ENCOUNTER
Please find out from patient when they sent in the [facility] paperwork. I do not have his forms on my desk. She may need to resend the forms.   Chanell Conn MD 2
